# Patient Record
Sex: FEMALE | Race: WHITE | NOT HISPANIC OR LATINO | Employment: OTHER | URBAN - NONMETROPOLITAN AREA
[De-identification: names, ages, dates, MRNs, and addresses within clinical notes are randomized per-mention and may not be internally consistent; named-entity substitution may affect disease eponyms.]

---

## 2021-01-01 ENCOUNTER — HOSPITAL ENCOUNTER (INPATIENT)
Facility: HOSPITAL | Age: 66
LOS: 3 days | DRG: 177 | End: 2021-07-01
Attending: EMERGENCY MEDICINE | Admitting: STUDENT IN AN ORGANIZED HEALTH CARE EDUCATION/TRAINING PROGRAM
Payer: COMMERCIAL

## 2021-01-01 ENCOUNTER — APPOINTMENT (EMERGENCY)
Dept: CT IMAGING | Facility: HOSPITAL | Age: 66
DRG: 177 | End: 2021-01-01
Payer: COMMERCIAL

## 2021-01-01 ENCOUNTER — APPOINTMENT (INPATIENT)
Dept: RADIOLOGY | Facility: HOSPITAL | Age: 66
DRG: 177 | End: 2021-01-01
Payer: COMMERCIAL

## 2021-01-01 ENCOUNTER — APPOINTMENT (INPATIENT)
Dept: ULTRASOUND IMAGING | Facility: HOSPITAL | Age: 66
DRG: 177 | End: 2021-01-01
Payer: COMMERCIAL

## 2021-01-01 ENCOUNTER — APPOINTMENT (EMERGENCY)
Dept: RADIOLOGY | Facility: HOSPITAL | Age: 66
DRG: 177 | End: 2021-01-01
Payer: COMMERCIAL

## 2021-01-01 ENCOUNTER — APPOINTMENT (INPATIENT)
Dept: MRI IMAGING | Facility: HOSPITAL | Age: 66
DRG: 177 | End: 2021-01-01
Payer: COMMERCIAL

## 2021-01-01 VITALS
BODY MASS INDEX: 26.75 KG/M2 | WEIGHT: 151 LBS | SYSTOLIC BLOOD PRESSURE: 89 MMHG | DIASTOLIC BLOOD PRESSURE: 51 MMHG | TEMPERATURE: 95 F | OXYGEN SATURATION: 94 % | HEART RATE: 88 BPM | HEIGHT: 63 IN | RESPIRATION RATE: 30 BRPM

## 2021-01-01 DIAGNOSIS — R79.89 ELEVATED LFTS: ICD-10-CM

## 2021-01-01 DIAGNOSIS — R74.01 ELEVATION OF LEVELS OF LIVER TRANSAMINASE LEVELS: ICD-10-CM

## 2021-01-01 DIAGNOSIS — K29.70 GASTRITIS: ICD-10-CM

## 2021-01-01 DIAGNOSIS — A41.9 SEPSIS (HCC): ICD-10-CM

## 2021-01-01 DIAGNOSIS — R11.2 NAUSEA & VOMITING: Primary | ICD-10-CM

## 2021-01-01 DIAGNOSIS — N17.9 AKI (ACUTE KIDNEY INJURY) (HCC): ICD-10-CM

## 2021-01-01 LAB
ABO GROUP BLD: NORMAL
ALBUMIN SERPL BCP-MCNC: 2.2 G/DL (ref 3.5–5)
ALBUMIN SERPL BCP-MCNC: 2.2 G/DL (ref 3.5–5)
ALBUMIN SERPL BCP-MCNC: 2.4 G/DL (ref 3.5–5)
ALP SERPL-CCNC: 445 U/L (ref 46–116)
ALP SERPL-CCNC: 498 U/L (ref 46–116)
ALP SERPL-CCNC: 565 U/L (ref 46–116)
ALT SERPL W P-5'-P-CCNC: 178 U/L (ref 12–78)
ALT SERPL W P-5'-P-CCNC: 192 U/L (ref 12–78)
ALT SERPL W P-5'-P-CCNC: 293 U/L (ref 12–78)
ANION GAP SERPL CALCULATED.3IONS-SCNC: 16 MMOL/L (ref 4–13)
ANION GAP SERPL CALCULATED.3IONS-SCNC: 19 MMOL/L (ref 4–13)
ANION GAP SERPL CALCULATED.3IONS-SCNC: 20 MMOL/L (ref 4–13)
APTT PPP: 27 SECONDS (ref 23–37)
AST SERPL W P-5'-P-CCNC: 329 U/L (ref 5–45)
AST SERPL W P-5'-P-CCNC: 356 U/L (ref 5–45)
AST SERPL W P-5'-P-CCNC: 982 U/L (ref 5–45)
BACTERIA UR CULT: ABNORMAL
BACTERIA UR QL AUTO: ABNORMAL /HPF
BASE EX.OXY STD BLDV CALC-SCNC: 60.7 % (ref 60–80)
BASE EXCESS BLDV CALC-SCNC: -12.9 MMOL/L
BASOPHILS # BLD AUTO: 0.04 THOUSANDS/ΜL (ref 0–0.1)
BASOPHILS # BLD AUTO: 0.05 THOUSANDS/ΜL (ref 0–0.1)
BASOPHILS # BLD MANUAL: 0 THOUSAND/UL (ref 0–0.1)
BASOPHILS NFR BLD AUTO: 0 % (ref 0–1)
BASOPHILS NFR BLD AUTO: 0 % (ref 0–1)
BASOPHILS NFR MAR MANUAL: 0 % (ref 0–1)
BILIRUB SERPL-MCNC: 2.77 MG/DL (ref 0.2–1)
BILIRUB SERPL-MCNC: 3.94 MG/DL (ref 0.2–1)
BILIRUB SERPL-MCNC: 4.56 MG/DL (ref 0.2–1)
BILIRUB UR QL STRIP: ABNORMAL
BLD GP AB SCN SERPL QL: NEGATIVE
BUN SERPL-MCNC: 32 MG/DL (ref 5–25)
BUN SERPL-MCNC: 33 MG/DL (ref 5–25)
BUN SERPL-MCNC: 37 MG/DL (ref 5–25)
CALCIUM ALBUM COR SERPL-MCNC: 7.9 MG/DL (ref 8.3–10.1)
CALCIUM ALBUM COR SERPL-MCNC: 8.4 MG/DL (ref 8.3–10.1)
CALCIUM ALBUM COR SERPL-MCNC: 9.4 MG/DL (ref 8.3–10.1)
CALCIUM SERPL-MCNC: 6.5 MG/DL (ref 8.3–10.1)
CALCIUM SERPL-MCNC: 7 MG/DL (ref 8.3–10.1)
CALCIUM SERPL-MCNC: 8.1 MG/DL (ref 8.3–10.1)
CHLORIDE SERPL-SCNC: 104 MMOL/L (ref 100–108)
CHLORIDE SERPL-SCNC: 105 MMOL/L (ref 100–108)
CHLORIDE SERPL-SCNC: 98 MMOL/L (ref 100–108)
CLARITY UR: CLEAR
CO2 SERPL-SCNC: 15 MMOL/L (ref 21–32)
CO2 SERPL-SCNC: 16 MMOL/L (ref 21–32)
CO2 SERPL-SCNC: 20 MMOL/L (ref 21–32)
COARSE GRAN CASTS URNS QL MICRO: ABNORMAL /LPF
COLOR UR: ABNORMAL
CREAT SERPL-MCNC: 1.43 MG/DL (ref 0.6–1.3)
CREAT SERPL-MCNC: 1.83 MG/DL (ref 0.6–1.3)
CREAT SERPL-MCNC: 2.37 MG/DL (ref 0.6–1.3)
EOSINOPHIL # BLD AUTO: 0.01 THOUSAND/ΜL (ref 0–0.61)
EOSINOPHIL # BLD AUTO: 0.01 THOUSAND/ΜL (ref 0–0.61)
EOSINOPHIL # BLD MANUAL: 0 THOUSAND/UL (ref 0–0.4)
EOSINOPHIL NFR BLD AUTO: 0 % (ref 0–6)
EOSINOPHIL NFR BLD AUTO: 0 % (ref 0–6)
EOSINOPHIL NFR BLD MANUAL: 0 % (ref 0–6)
ERYTHROCYTE [DISTWIDTH] IN BLOOD BY AUTOMATED COUNT: 18.7 % (ref 11.6–15.1)
ERYTHROCYTE [DISTWIDTH] IN BLOOD BY AUTOMATED COUNT: 19.4 % (ref 11.6–15.1)
ERYTHROCYTE [DISTWIDTH] IN BLOOD BY AUTOMATED COUNT: 20.2 % (ref 11.6–15.1)
GFR SERPL CREATININE-BSD FRML MDRD: 21 ML/MIN/1.73SQ M
GFR SERPL CREATININE-BSD FRML MDRD: 28 ML/MIN/1.73SQ M
GFR SERPL CREATININE-BSD FRML MDRD: 38 ML/MIN/1.73SQ M
GLUCOSE SERPL-MCNC: 139 MG/DL (ref 65–140)
GLUCOSE SERPL-MCNC: 225 MG/DL (ref 65–140)
GLUCOSE SERPL-MCNC: 71 MG/DL (ref 65–140)
GLUCOSE UR STRIP-MCNC: ABNORMAL MG/DL
HCO3 BLDV-SCNC: 12.8 MMOL/L (ref 24–30)
HCT VFR BLD AUTO: 37.7 % (ref 34.8–46.1)
HCT VFR BLD AUTO: 39.8 % (ref 34.8–46.1)
HCT VFR BLD AUTO: 40 % (ref 34.8–46.1)
HGB BLD-MCNC: 12.2 G/DL (ref 11.5–15.4)
HGB BLD-MCNC: 13.2 G/DL (ref 11.5–15.4)
HGB BLD-MCNC: 13.4 G/DL (ref 11.5–15.4)
HGB UR QL STRIP.AUTO: ABNORMAL
HYALINE CASTS #/AREA URNS LPF: ABNORMAL /LPF
IMM GRANULOCYTES # BLD AUTO: 0.19 THOUSAND/UL (ref 0–0.2)
IMM GRANULOCYTES # BLD AUTO: 0.32 THOUSAND/UL (ref 0–0.2)
IMM GRANULOCYTES NFR BLD AUTO: 1 % (ref 0–2)
IMM GRANULOCYTES NFR BLD AUTO: 2 % (ref 0–2)
INR PPP: 1.21 (ref 0.84–1.19)
KETONES UR STRIP-MCNC: ABNORMAL MG/DL
LACTATE SERPL-SCNC: 2 MMOL/L (ref 0.5–2)
LACTATE SERPL-SCNC: 5.1 MMOL/L (ref 0.5–2)
LEUKOCYTE ESTERASE UR QL STRIP: ABNORMAL
LYMPHOCYTES # BLD AUTO: 1.6 THOUSANDS/ΜL (ref 0.6–4.47)
LYMPHOCYTES # BLD AUTO: 1.79 THOUSAND/UL (ref 0.6–4.47)
LYMPHOCYTES # BLD AUTO: 2.39 THOUSANDS/ΜL (ref 0.6–4.47)
LYMPHOCYTES # BLD AUTO: 8 % (ref 14–44)
LYMPHOCYTES NFR BLD AUTO: 14 % (ref 14–44)
LYMPHOCYTES NFR BLD AUTO: 9 % (ref 14–44)
MAGNESIUM SERPL-MCNC: 2.4 MG/DL (ref 1.6–2.6)
MCH RBC QN AUTO: 32.6 PG (ref 26.8–34.3)
MCH RBC QN AUTO: 32.7 PG (ref 26.8–34.3)
MCH RBC QN AUTO: 33.2 PG (ref 26.8–34.3)
MCHC RBC AUTO-ENTMCNC: 32.4 G/DL (ref 31.4–37.4)
MCHC RBC AUTO-ENTMCNC: 33.2 G/DL (ref 31.4–37.4)
MCHC RBC AUTO-ENTMCNC: 33.5 G/DL (ref 31.4–37.4)
MCV RBC AUTO: 100 FL (ref 82–98)
MCV RBC AUTO: 101 FL (ref 82–98)
MCV RBC AUTO: 97 FL (ref 82–98)
MONOCYTES # BLD AUTO: 0.67 THOUSAND/UL (ref 0–1.22)
MONOCYTES # BLD AUTO: 1.29 THOUSAND/ΜL (ref 0.17–1.22)
MONOCYTES # BLD AUTO: 1.63 THOUSAND/ΜL (ref 0.17–1.22)
MONOCYTES NFR BLD AUTO: 7 % (ref 4–12)
MONOCYTES NFR BLD AUTO: 9 % (ref 4–12)
MONOCYTES NFR BLD: 3 % (ref 4–12)
MRSA NOSE QL CULT: NORMAL
NEUTROPHILS # BLD AUTO: 13.49 THOUSANDS/ΜL (ref 1.85–7.62)
NEUTROPHILS # BLD AUTO: 14.44 THOUSANDS/ΜL (ref 1.85–7.62)
NEUTROPHILS # BLD MANUAL: 19.92 THOUSAND/UL (ref 1.85–7.62)
NEUTS SEG NFR BLD AUTO: 76 % (ref 43–75)
NEUTS SEG NFR BLD AUTO: 82 % (ref 43–75)
NEUTS SEG NFR BLD AUTO: 89 % (ref 43–75)
NITRITE UR QL STRIP: NEGATIVE
NON-SQ EPI CELLS URNS QL MICRO: ABNORMAL /HPF
NRBC BLD AUTO-RTO: 0 /100 WBCS
NRBC BLD AUTO-RTO: 1 /100 WBCS
NRBC BLD AUTO-RTO: 1 /100 WBCS
O2 CT BLDV-SCNC: 11.1 ML/DL
OTHER STN SPEC: ABNORMAL
PCO2 BLDV: 29.2 MM HG (ref 42–50)
PH BLDV: 7.26 [PH] (ref 7.3–7.4)
PH UR STRIP.AUTO: 5.5 [PH]
PHOSPHATE SERPL-MCNC: 3.2 MG/DL (ref 2.3–4.1)
PLATELET # BLD AUTO: 239 THOUSANDS/UL (ref 149–390)
PLATELET # BLD AUTO: 251 THOUSANDS/UL (ref 149–390)
PLATELET # BLD AUTO: 282 THOUSANDS/UL (ref 149–390)
PLATELET BLD QL SMEAR: ADEQUATE
PMV BLD AUTO: 10 FL (ref 8.9–12.7)
PMV BLD AUTO: 10.2 FL (ref 8.9–12.7)
PMV BLD AUTO: 10.4 FL (ref 8.9–12.7)
PO2 BLDV: 38.3 MM HG (ref 35–45)
POLYCHROMASIA BLD QL SMEAR: PRESENT
POTASSIUM SERPL-SCNC: 4 MMOL/L (ref 3.5–5.3)
POTASSIUM SERPL-SCNC: 4.7 MMOL/L (ref 3.5–5.3)
POTASSIUM SERPL-SCNC: 4.9 MMOL/L (ref 3.5–5.3)
PROCALCITONIN SERPL-MCNC: 1.46 NG/ML
PROCALCITONIN SERPL-MCNC: 2.03 NG/ML
PROT SERPL-MCNC: 5 G/DL (ref 6.4–8.2)
PROT SERPL-MCNC: 5.4 G/DL (ref 6.4–8.2)
PROT SERPL-MCNC: 5.6 G/DL (ref 6.4–8.2)
PROT UR STRIP-MCNC: ABNORMAL MG/DL
PROTHROMBIN TIME: 15.1 SECONDS (ref 11.6–14.5)
RBC # BLD AUTO: 3.73 MILLION/UL (ref 3.81–5.12)
RBC # BLD AUTO: 3.98 MILLION/UL (ref 3.81–5.12)
RBC # BLD AUTO: 4.11 MILLION/UL (ref 3.81–5.12)
RBC #/AREA URNS AUTO: ABNORMAL /HPF
RH BLD: POSITIVE
SODIUM SERPL-SCNC: 134 MMOL/L (ref 136–145)
SODIUM SERPL-SCNC: 139 MMOL/L (ref 136–145)
SODIUM SERPL-SCNC: 140 MMOL/L (ref 136–145)
SP GR UR STRIP.AUTO: 1.02 (ref 1–1.03)
SPECIMEN EXPIRATION DATE: NORMAL
TOTAL CELLS COUNTED SPEC: 100
TSH SERPL DL<=0.05 MIU/L-ACNC: 0.62 UIU/ML (ref 0.36–3.74)
UROBILINOGEN UR QL STRIP.AUTO: >=8 E.U./DL
WBC # BLD AUTO: 17.7 THOUSAND/UL (ref 4.31–10.16)
WBC # BLD AUTO: 17.76 THOUSAND/UL (ref 4.31–10.16)
WBC # BLD AUTO: 22.38 THOUSAND/UL (ref 4.31–10.16)
WBC #/AREA URNS AUTO: ABNORMAL /HPF

## 2021-01-01 PROCEDURE — 99232 SBSQ HOSP IP/OBS MODERATE 35: CPT | Performed by: PHYSICIAN ASSISTANT

## 2021-01-01 PROCEDURE — 96375 TX/PRO/DX INJ NEW DRUG ADDON: CPT

## 2021-01-01 PROCEDURE — 74176 CT ABD & PELVIS W/O CONTRAST: CPT

## 2021-01-01 PROCEDURE — 76705 ECHO EXAM OF ABDOMEN: CPT

## 2021-01-01 PROCEDURE — 86901 BLOOD TYPING SEROLOGIC RH(D): CPT | Performed by: EMERGENCY MEDICINE

## 2021-01-01 PROCEDURE — 86850 RBC ANTIBODY SCREEN: CPT | Performed by: EMERGENCY MEDICINE

## 2021-01-01 PROCEDURE — 99497 ADVNCD CARE PLAN 30 MIN: CPT | Performed by: PHYSICIAN ASSISTANT

## 2021-01-01 PROCEDURE — 87081 CULTURE SCREEN ONLY: CPT | Performed by: NURSE PRACTITIONER

## 2021-01-01 PROCEDURE — 82805 BLOOD GASES W/O2 SATURATION: CPT | Performed by: PHYSICIAN ASSISTANT

## 2021-01-01 PROCEDURE — C9113 INJ PANTOPRAZOLE SODIUM, VIA: HCPCS | Performed by: NURSE PRACTITIONER

## 2021-01-01 PROCEDURE — 87086 URINE CULTURE/COLONY COUNT: CPT | Performed by: EMERGENCY MEDICINE

## 2021-01-01 PROCEDURE — 96367 TX/PROPH/DG ADDL SEQ IV INF: CPT

## 2021-01-01 PROCEDURE — 71045 X-RAY EXAM CHEST 1 VIEW: CPT

## 2021-01-01 PROCEDURE — 87186 SC STD MICRODIL/AGAR DIL: CPT | Performed by: EMERGENCY MEDICINE

## 2021-01-01 PROCEDURE — 80053 COMPREHEN METABOLIC PANEL: CPT | Performed by: EMERGENCY MEDICINE

## 2021-01-01 PROCEDURE — 85610 PROTHROMBIN TIME: CPT | Performed by: EMERGENCY MEDICINE

## 2021-01-01 PROCEDURE — 81001 URINALYSIS AUTO W/SCOPE: CPT | Performed by: EMERGENCY MEDICINE

## 2021-01-01 PROCEDURE — 84100 ASSAY OF PHOSPHORUS: CPT | Performed by: NURSE PRACTITIONER

## 2021-01-01 PROCEDURE — 84145 PROCALCITONIN (PCT): CPT | Performed by: NURSE PRACTITIONER

## 2021-01-01 PROCEDURE — C9113 INJ PANTOPRAZOLE SODIUM, VIA: HCPCS | Performed by: EMERGENCY MEDICINE

## 2021-01-01 PROCEDURE — 86900 BLOOD TYPING SEROLOGIC ABO: CPT | Performed by: EMERGENCY MEDICINE

## 2021-01-01 PROCEDURE — 70450 CT HEAD/BRAIN W/O DYE: CPT

## 2021-01-01 PROCEDURE — 85007 BL SMEAR W/DIFF WBC COUNT: CPT | Performed by: PHYSICIAN ASSISTANT

## 2021-01-01 PROCEDURE — 99291 CRITICAL CARE FIRST HOUR: CPT | Performed by: EMERGENCY MEDICINE

## 2021-01-01 PROCEDURE — 99239 HOSP IP/OBS DSCHRG MGMT >30: CPT | Performed by: NURSE PRACTITIONER

## 2021-01-01 PROCEDURE — 83605 ASSAY OF LACTIC ACID: CPT | Performed by: EMERGENCY MEDICINE

## 2021-01-01 PROCEDURE — 80053 COMPREHEN METABOLIC PANEL: CPT | Performed by: NURSE PRACTITIONER

## 2021-01-01 PROCEDURE — 99223 1ST HOSP IP/OBS HIGH 75: CPT | Performed by: STUDENT IN AN ORGANIZED HEALTH CARE EDUCATION/TRAINING PROGRAM

## 2021-01-01 PROCEDURE — G1004 CDSM NDSC: HCPCS

## 2021-01-01 PROCEDURE — 87077 CULTURE AEROBIC IDENTIFY: CPT | Performed by: EMERGENCY MEDICINE

## 2021-01-01 PROCEDURE — 85730 THROMBOPLASTIN TIME PARTIAL: CPT | Performed by: EMERGENCY MEDICINE

## 2021-01-01 PROCEDURE — 84443 ASSAY THYROID STIM HORMONE: CPT | Performed by: NURSE PRACTITIONER

## 2021-01-01 PROCEDURE — 85027 COMPLETE CBC AUTOMATED: CPT | Performed by: PHYSICIAN ASSISTANT

## 2021-01-01 PROCEDURE — 83735 ASSAY OF MAGNESIUM: CPT | Performed by: NURSE PRACTITIONER

## 2021-01-01 PROCEDURE — 99285 EMERGENCY DEPT VISIT HI MDM: CPT

## 2021-01-01 PROCEDURE — 96365 THER/PROPH/DIAG IV INF INIT: CPT

## 2021-01-01 PROCEDURE — 87040 BLOOD CULTURE FOR BACTERIA: CPT | Performed by: EMERGENCY MEDICINE

## 2021-01-01 PROCEDURE — 74181 MRI ABDOMEN W/O CONTRAST: CPT

## 2021-01-01 PROCEDURE — 80053 COMPREHEN METABOLIC PANEL: CPT | Performed by: PHYSICIAN ASSISTANT

## 2021-01-01 PROCEDURE — 85025 COMPLETE CBC W/AUTO DIFF WBC: CPT | Performed by: EMERGENCY MEDICINE

## 2021-01-01 PROCEDURE — 85025 COMPLETE CBC W/AUTO DIFF WBC: CPT | Performed by: NURSE PRACTITIONER

## 2021-01-01 PROCEDURE — 36415 COLL VENOUS BLD VENIPUNCTURE: CPT | Performed by: EMERGENCY MEDICINE

## 2021-01-01 PROCEDURE — 96361 HYDRATE IV INFUSION ADD-ON: CPT

## 2021-01-01 RX ORDER — DIPHENHYDRAMINE HCL 25 MG
25 TABLET ORAL EVERY 6 HOURS PRN
Status: DISCONTINUED | OUTPATIENT
Start: 2021-01-01 | End: 2021-01-01 | Stop reason: HOSPADM

## 2021-01-01 RX ORDER — HYDROCORTISONE 10 MG/1
10 TABLET ORAL DAILY
Status: DISCONTINUED | OUTPATIENT
Start: 2021-01-01 | End: 2021-01-01

## 2021-01-01 RX ORDER — LEVOTHYROXINE SODIUM 0.05 MG/1
50 TABLET ORAL DAILY
Status: DISCONTINUED | OUTPATIENT
Start: 2021-01-01 | End: 2021-01-01

## 2021-01-01 RX ORDER — HEPARIN SODIUM 5000 [USP'U]/ML
5000 INJECTION, SOLUTION INTRAVENOUS; SUBCUTANEOUS EVERY 8 HOURS SCHEDULED
Status: DISCONTINUED | OUTPATIENT
Start: 2021-01-01 | End: 2021-01-01

## 2021-01-01 RX ORDER — HYDROMORPHONE HCL/PF 1 MG/ML
0.5 SYRINGE (ML) INJECTION EVERY 4 HOURS PRN
Status: DISCONTINUED | OUTPATIENT
Start: 2021-01-01 | End: 2021-01-01

## 2021-01-01 RX ORDER — ACETAMINOPHEN 325 MG/1
650 TABLET ORAL EVERY 6 HOURS PRN
Status: DISCONTINUED | OUTPATIENT
Start: 2021-01-01 | End: 2021-01-01 | Stop reason: HOSPADM

## 2021-01-01 RX ORDER — FLUOXETINE HYDROCHLORIDE 20 MG/1
20 CAPSULE ORAL DAILY
Status: DISCONTINUED | OUTPATIENT
Start: 2021-01-01 | End: 2021-01-01

## 2021-01-01 RX ORDER — CHOLECALCIFEROL (VITAMIN D3) 125 MCG
CAPSULE ORAL
COMMUNITY

## 2021-01-01 RX ORDER — HYDROCORTISONE 5 MG/1
5 TABLET ORAL DAILY
COMMUNITY
End: 2021-01-01 | Stop reason: HOSPADM

## 2021-01-01 RX ORDER — BISACODYL 10 MG
10 SUPPOSITORY, RECTAL RECTAL DAILY PRN
Status: DISCONTINUED | OUTPATIENT
Start: 2021-01-01 | End: 2021-01-01 | Stop reason: HOSPADM

## 2021-01-01 RX ORDER — FUROSEMIDE 10 MG/ML
20 INJECTION INTRAMUSCULAR; INTRAVENOUS ONCE
Status: COMPLETED | OUTPATIENT
Start: 2021-01-01 | End: 2021-01-01

## 2021-01-01 RX ORDER — HYDROCORTISONE 10 MG/1
10 TABLET ORAL DAILY
COMMUNITY
End: 2021-01-01 | Stop reason: HOSPADM

## 2021-01-01 RX ORDER — SIMETHICONE 20 MG/.3ML
40 EMULSION ORAL 3 TIMES DAILY
Status: DISCONTINUED | OUTPATIENT
Start: 2021-01-01 | End: 2021-01-01

## 2021-01-01 RX ORDER — ASPIRIN 81 MG/1
81 TABLET ORAL DAILY
COMMUNITY
End: 2021-01-01 | Stop reason: HOSPADM

## 2021-01-01 RX ORDER — SIMETHICONE 20 MG/.3ML
40 EMULSION ORAL EVERY 6 HOURS PRN
Status: DISCONTINUED | OUTPATIENT
Start: 2021-01-01 | End: 2021-01-01

## 2021-01-01 RX ORDER — LORAZEPAM 2 MG/ML
1 INJECTION INTRAMUSCULAR
Status: DISCONTINUED | OUTPATIENT
Start: 2021-01-01 | End: 2021-01-01 | Stop reason: HOSPADM

## 2021-01-01 RX ORDER — HYDROMORPHONE HCL/PF 1 MG/ML
0.3 SYRINGE (ML) INJECTION EVERY 2 HOUR PRN
Status: DISCONTINUED | OUTPATIENT
Start: 2021-01-01 | End: 2021-01-01 | Stop reason: HOSPADM

## 2021-01-01 RX ORDER — VANCOMYCIN HYDROCHLORIDE 1 G/200ML
15 INJECTION, SOLUTION INTRAVENOUS EVERY 12 HOURS
Status: DISCONTINUED | OUTPATIENT
Start: 2021-01-01 | End: 2021-01-01 | Stop reason: DRUGHIGH

## 2021-01-01 RX ORDER — ONDANSETRON 2 MG/ML
4 INJECTION INTRAMUSCULAR; INTRAVENOUS EVERY 8 HOURS PRN
Status: DISCONTINUED | OUTPATIENT
Start: 2021-01-01 | End: 2021-01-01 | Stop reason: HOSPADM

## 2021-01-01 RX ORDER — LEVOTHYROXINE SODIUM 0.05 MG/1
50 TABLET ORAL DAILY
COMMUNITY
End: 2021-01-01 | Stop reason: HOSPADM

## 2021-01-01 RX ORDER — METOCLOPRAMIDE HYDROCHLORIDE 5 MG/ML
10 INJECTION INTRAMUSCULAR; INTRAVENOUS ONCE
Status: COMPLETED | OUTPATIENT
Start: 2021-01-01 | End: 2021-01-01

## 2021-01-01 RX ORDER — HYDROCORTISONE 5 MG/1
5 TABLET ORAL DAILY
Status: DISCONTINUED | OUTPATIENT
Start: 2021-01-01 | End: 2021-01-01

## 2021-01-01 RX ORDER — PANTOPRAZOLE SODIUM 40 MG/1
40 INJECTION, POWDER, FOR SOLUTION INTRAVENOUS
Status: DISCONTINUED | OUTPATIENT
Start: 2021-01-01 | End: 2021-01-01

## 2021-01-01 RX ORDER — GLYCOPYRROLATE 0.2 MG/ML
0.1 INJECTION INTRAMUSCULAR; INTRAVENOUS EVERY 4 HOURS PRN
Status: DISCONTINUED | OUTPATIENT
Start: 2021-01-01 | End: 2021-01-01 | Stop reason: HOSPADM

## 2021-01-01 RX ORDER — SODIUM CHLORIDE 9 MG/ML
100 INJECTION, SOLUTION INTRAVENOUS CONTINUOUS
Status: DISCONTINUED | OUTPATIENT
Start: 2021-01-01 | End: 2021-01-01

## 2021-01-01 RX ORDER — DEXAMETHASONE SODIUM PHOSPHATE 10 MG/ML
10 INJECTION, SOLUTION INTRAMUSCULAR; INTRAVENOUS ONCE
Status: COMPLETED | OUTPATIENT
Start: 2021-01-01 | End: 2021-01-01

## 2021-01-01 RX ORDER — OXYCODONE HYDROCHLORIDE 5 MG/1
2.5 TABLET ORAL EVERY 6 HOURS PRN
Status: DISCONTINUED | OUTPATIENT
Start: 2021-01-01 | End: 2021-01-01 | Stop reason: HOSPADM

## 2021-01-01 RX ORDER — ASPIRIN 81 MG/1
81 TABLET ORAL DAILY
Status: DISCONTINUED | OUTPATIENT
Start: 2021-01-01 | End: 2021-01-01

## 2021-01-01 RX ORDER — LANOLIN ALCOHOL/MO/W.PET/CERES
6 CREAM (GRAM) TOPICAL
Status: DISCONTINUED | OUTPATIENT
Start: 2021-01-01 | End: 2021-01-01

## 2021-01-01 RX ORDER — OXYCODONE HYDROCHLORIDE 5 MG/1
5 TABLET ORAL EVERY 6 HOURS PRN
Status: DISCONTINUED | OUTPATIENT
Start: 2021-01-01 | End: 2021-01-01 | Stop reason: HOSPADM

## 2021-01-01 RX ORDER — VANCOMYCIN HYDROCHLORIDE 1 G/200ML
15 INJECTION, SOLUTION INTRAVENOUS ONCE
Status: COMPLETED | OUTPATIENT
Start: 2021-01-01 | End: 2021-01-01

## 2021-01-01 RX ORDER — DIPHENHYDRAMINE HYDROCHLORIDE 50 MG/ML
50 INJECTION INTRAMUSCULAR; INTRAVENOUS ONCE
Status: COMPLETED | OUTPATIENT
Start: 2021-01-01 | End: 2021-01-01

## 2021-01-01 RX ORDER — MORPHINE SULFATE 4 MG/ML
4 INJECTION, SOLUTION INTRAMUSCULAR; INTRAVENOUS ONCE
Status: COMPLETED | OUTPATIENT
Start: 2021-01-01 | End: 2021-01-01

## 2021-01-01 RX ORDER — DIPHENHYDRAMINE HYDROCHLORIDE 50 MG/ML
25 INJECTION INTRAMUSCULAR; INTRAVENOUS ONCE
Status: COMPLETED | OUTPATIENT
Start: 2021-01-01 | End: 2021-01-01

## 2021-01-01 RX ORDER — VANCOMYCIN HYDROCHLORIDE 1 G/200ML
15 INJECTION, SOLUTION INTRAVENOUS EVERY 24 HOURS
Status: DISCONTINUED | OUTPATIENT
Start: 2021-01-01 | End: 2021-01-01 | Stop reason: DRUGHIGH

## 2021-01-01 RX ORDER — HYDROMORPHONE HCL/PF 1 MG/ML
0.5 SYRINGE (ML) INJECTION ONCE
Status: DISCONTINUED | OUTPATIENT
Start: 2021-01-01 | End: 2021-01-01

## 2021-01-01 RX ORDER — CEFEPIME HYDROCHLORIDE 1 G/50ML
1000 INJECTION, SOLUTION INTRAVENOUS EVERY 12 HOURS
Status: DISCONTINUED | OUTPATIENT
Start: 2021-01-01 | End: 2021-01-01

## 2021-01-01 RX ORDER — FLUOXETINE HYDROCHLORIDE 20 MG/1
20 CAPSULE ORAL DAILY
COMMUNITY
End: 2021-01-01 | Stop reason: HOSPADM

## 2021-01-01 RX ORDER — DIAZEPAM 5 MG/ML
5 INJECTION, SOLUTION INTRAMUSCULAR; INTRAVENOUS ONCE AS NEEDED
Status: COMPLETED | OUTPATIENT
Start: 2021-01-01 | End: 2021-01-01

## 2021-01-01 RX ADMIN — PIPERACILLIN AND TAZOBACTAM 2.25 G: 2; .25 INJECTION, POWDER, LYOPHILIZED, FOR SOLUTION INTRAVENOUS at 09:18

## 2021-01-01 RX ADMIN — PIPERACILLIN AND TAZOBACTAM 2.25 G: 2; .25 INJECTION, POWDER, LYOPHILIZED, FOR SOLUTION INTRAVENOUS at 14:03

## 2021-01-01 RX ADMIN — OXYCODONE HYDROCHLORIDE 5 MG: 5 TABLET ORAL at 14:40

## 2021-01-01 RX ADMIN — PIPERACILLIN AND TAZOBACTAM 2.25 G: 2; .25 INJECTION, POWDER, LYOPHILIZED, FOR SOLUTION INTRAVENOUS at 01:22

## 2021-01-01 RX ADMIN — VANCOMYCIN HYDROCHLORIDE 1000 MG: 1 INJECTION, SOLUTION INTRAVENOUS at 05:18

## 2021-01-01 RX ADMIN — SODIUM CHLORIDE 3.38 G: 9 INJECTION, SOLUTION INTRAVENOUS at 05:23

## 2021-01-01 RX ADMIN — FLUOXETINE 20 MG: 20 CAPSULE ORAL at 09:31

## 2021-01-01 RX ADMIN — HEPARIN SODIUM 5000 UNITS: 5000 INJECTION INTRAVENOUS; SUBCUTANEOUS at 01:23

## 2021-01-01 RX ADMIN — SIMETHICONE 40 MG: 20 SUSPENSION/ DROPS ORAL at 14:08

## 2021-01-01 RX ADMIN — SODIUM CHLORIDE 125 ML/HR: 0.9 INJECTION, SOLUTION INTRAVENOUS at 09:37

## 2021-01-01 RX ADMIN — PIPERACILLIN AND TAZOBACTAM 2.25 G: 2; .25 INJECTION, POWDER, LYOPHILIZED, FOR SOLUTION INTRAVENOUS at 14:02

## 2021-01-01 RX ADMIN — LEVOTHYROXINE SODIUM 50 MCG: 50 TABLET ORAL at 08:01

## 2021-01-01 RX ADMIN — SIMETHICONE 40 MG: 20 SUSPENSION/ DROPS ORAL at 16:32

## 2021-01-01 RX ADMIN — ONDANSETRON 4 MG: 2 INJECTION INTRAMUSCULAR; INTRAVENOUS at 01:28

## 2021-01-01 RX ADMIN — SODIUM CHLORIDE 125 ML/HR: 0.9 INJECTION, SOLUTION INTRAVENOUS at 04:45

## 2021-01-01 RX ADMIN — MELATONIN 6 MG: 3 TAB ORAL at 20:41

## 2021-01-01 RX ADMIN — HEPARIN SODIUM 5000 UNITS: 5000 INJECTION INTRAVENOUS; SUBCUTANEOUS at 18:07

## 2021-01-01 RX ADMIN — METOCLOPRAMIDE HYDROCHLORIDE 10 MG: 5 INJECTION INTRAMUSCULAR; INTRAVENOUS at 02:34

## 2021-01-01 RX ADMIN — PIPERACILLIN AND TAZOBACTAM 2.25 G: 2; .25 INJECTION, POWDER, LYOPHILIZED, FOR SOLUTION INTRAVENOUS at 20:41

## 2021-01-01 RX ADMIN — PANTOPRAZOLE SODIUM 40 MG: 40 INJECTION, POWDER, FOR SOLUTION INTRAVENOUS at 08:00

## 2021-01-01 RX ADMIN — SODIUM CHLORIDE 2000 ML: 0.9 INJECTION, SOLUTION INTRAVENOUS at 02:22

## 2021-01-01 RX ADMIN — VANCOMYCIN HYDROCHLORIDE 1000 MG: 1 INJECTION, SOLUTION INTRAVENOUS at 04:06

## 2021-01-01 RX ADMIN — CEFEPIME HYDROCHLORIDE 1000 MG: 1 INJECTION, SOLUTION INTRAVENOUS at 14:05

## 2021-01-01 RX ADMIN — HYDROCORTISONE 5 MG: 10 TABLET ORAL at 16:37

## 2021-01-01 RX ADMIN — HEPARIN SODIUM 5000 UNITS: 5000 INJECTION INTRAVENOUS; SUBCUTANEOUS at 18:02

## 2021-01-01 RX ADMIN — HYDROCORTISONE 5 MG: 10 TABLET ORAL at 18:07

## 2021-01-01 RX ADMIN — HYDROMORPHONE HYDROCHLORIDE 0.5 MG: 1 INJECTION, SOLUTION INTRAMUSCULAR; INTRAVENOUS; SUBCUTANEOUS at 16:32

## 2021-01-01 RX ADMIN — ASPIRIN 81 MG: 81 TABLET, COATED ORAL at 08:01

## 2021-01-01 RX ADMIN — LEVOTHYROXINE SODIUM 50 MCG: 50 TABLET ORAL at 09:34

## 2021-01-01 RX ADMIN — DIAZEPAM 5 MG: 10 INJECTION, SOLUTION INTRAMUSCULAR; INTRAVENOUS at 15:18

## 2021-01-01 RX ADMIN — ONDANSETRON 4 MG: 2 INJECTION INTRAMUSCULAR; INTRAVENOUS at 16:23

## 2021-01-01 RX ADMIN — HEPARIN SODIUM 5000 UNITS: 5000 INJECTION INTRAVENOUS; SUBCUTANEOUS at 09:31

## 2021-01-01 RX ADMIN — HYDROCORTISONE 10 MG: 10 TABLET ORAL at 09:35

## 2021-01-01 RX ADMIN — ONDANSETRON 4 MG: 2 INJECTION INTRAMUSCULAR; INTRAVENOUS at 00:17

## 2021-01-01 RX ADMIN — VANCOMYCIN HYDROCHLORIDE 750 MG: 750 INJECTION, SOLUTION INTRAVENOUS at 14:44

## 2021-01-01 RX ADMIN — HYDROCORTISONE 10 MG: 10 TABLET ORAL at 09:40

## 2021-01-01 RX ADMIN — FUROSEMIDE 20 MG: 10 INJECTION, SOLUTION INTRAMUSCULAR; INTRAVENOUS at 14:47

## 2021-01-01 RX ADMIN — HYDROMORPHONE HYDROCHLORIDE 0.3 MG: 1 INJECTION, SOLUTION INTRAMUSCULAR; INTRAVENOUS; SUBCUTANEOUS at 22:37

## 2021-01-01 RX ADMIN — PIPERACILLIN AND TAZOBACTAM 2.25 G: 2; .25 INJECTION, POWDER, LYOPHILIZED, FOR SOLUTION INTRAVENOUS at 20:46

## 2021-01-01 RX ADMIN — PIPERACILLIN AND TAZOBACTAM 2.25 G: 2; .25 INJECTION, POWDER, LYOPHILIZED, FOR SOLUTION INTRAVENOUS at 03:42

## 2021-01-01 RX ADMIN — HEPARIN SODIUM 5000 UNITS: 5000 INJECTION INTRAVENOUS; SUBCUTANEOUS at 09:34

## 2021-01-01 RX ADMIN — DEXAMETHASONE SODIUM PHOSPHATE 10 MG: 10 INJECTION, SOLUTION INTRAMUSCULAR; INTRAVENOUS at 02:34

## 2021-01-01 RX ADMIN — SIMETHICONE 40 MG: 20 SUSPENSION/ DROPS ORAL at 20:42

## 2021-01-01 RX ADMIN — ASPIRIN 81 MG: 81 TABLET, COATED ORAL at 09:35

## 2021-01-01 RX ADMIN — SODIUM CHLORIDE 125 ML/HR: 0.9 INJECTION, SOLUTION INTRAVENOUS at 01:24

## 2021-01-01 RX ADMIN — DIPHENHYDRAMINE HYDROCHLORIDE 25 MG: 50 INJECTION, SOLUTION INTRAMUSCULAR; INTRAVENOUS at 00:16

## 2021-01-01 RX ADMIN — HYDROCORTISONE 10 MG: 10 TABLET ORAL at 08:01

## 2021-01-01 RX ADMIN — LEVOTHYROXINE SODIUM 50 MCG: 50 TABLET ORAL at 09:31

## 2021-01-01 RX ADMIN — HEPARIN SODIUM 5000 UNITS: 5000 INJECTION INTRAVENOUS; SUBCUTANEOUS at 16:32

## 2021-01-01 RX ADMIN — HYDROCORTISONE 5 MG: 10 TABLET ORAL at 16:17

## 2021-01-01 RX ADMIN — LORAZEPAM 1 MG: 2 INJECTION INTRAMUSCULAR; INTRAVENOUS at 19:46

## 2021-01-01 RX ADMIN — MORPHINE SULFATE 4 MG: 4 INJECTION INTRAVENOUS at 00:16

## 2021-01-01 RX ADMIN — HEPARIN SODIUM 5000 UNITS: 5000 INJECTION INTRAVENOUS; SUBCUTANEOUS at 01:22

## 2021-01-01 RX ADMIN — FLUOXETINE 20 MG: 20 CAPSULE ORAL at 08:01

## 2021-01-01 RX ADMIN — FLUOXETINE 20 MG: 20 CAPSULE ORAL at 09:35

## 2021-01-01 RX ADMIN — ACETAMINOPHEN 650 MG: 325 TABLET ORAL at 20:41

## 2021-01-01 RX ADMIN — METRONIDAZOLE 500 MG: 500 INJECTION, SOLUTION INTRAVENOUS at 14:05

## 2021-01-01 RX ADMIN — DIPHENHYDRAMINE HYDROCHLORIDE 50 MG: 50 INJECTION, SOLUTION INTRAMUSCULAR; INTRAVENOUS at 01:24

## 2021-01-01 RX ADMIN — ONDANSETRON 8 MG: 2 INJECTION INTRAMUSCULAR; INTRAVENOUS at 01:35

## 2021-01-01 RX ADMIN — PIPERACILLIN AND TAZOBACTAM 2.25 G: 2; .25 INJECTION, POWDER, LYOPHILIZED, FOR SOLUTION INTRAVENOUS at 09:27

## 2021-01-01 RX ADMIN — SODIUM CHLORIDE 80 MG: 9 INJECTION, SOLUTION INTRAVENOUS at 02:00

## 2021-01-01 RX ADMIN — ASPIRIN 81 MG: 81 TABLET, COATED ORAL at 09:30

## 2021-01-01 RX ADMIN — HEPARIN SODIUM 5000 UNITS: 5000 INJECTION INTRAVENOUS; SUBCUTANEOUS at 09:19

## 2021-01-01 RX ADMIN — SODIUM CHLORIDE 125 ML/HR: 0.9 INJECTION, SOLUTION INTRAVENOUS at 19:29

## 2021-01-01 RX ADMIN — SODIUM CHLORIDE 125 ML/HR: 0.9 INJECTION, SOLUTION INTRAVENOUS at 05:21

## 2021-01-01 RX ADMIN — PANTOPRAZOLE SODIUM 40 MG: 40 INJECTION, POWDER, FOR SOLUTION INTRAVENOUS at 09:31

## 2021-06-28 PROBLEM — N17.9 AKI (ACUTE KIDNEY INJURY) (HCC): Status: ACTIVE | Noted: 2021-01-01

## 2021-06-28 PROBLEM — R65.20 SEPSIS WITH ACUTE ORGAN DYSFUNCTION (HCC): Status: ACTIVE | Noted: 2021-01-01

## 2021-06-28 PROBLEM — C50.919 METASTATIC BREAST CANCER (HCC): Status: ACTIVE | Noted: 2021-01-01

## 2021-06-28 PROBLEM — E03.9 HYPOTHYROIDISM: Status: ACTIVE | Noted: 2020-01-01

## 2021-06-28 PROBLEM — E23.7 PITUITARY DYSFUNCTION (HCC): Status: ACTIVE | Noted: 2021-01-01

## 2021-06-28 PROBLEM — R74.01 ELEVATION OF LEVELS OF LIVER TRANSAMINASE LEVELS: Status: ACTIVE | Noted: 2021-01-01

## 2021-06-28 PROBLEM — R44.1 VISUAL HALLUCINATIONS: Status: ACTIVE | Noted: 2021-01-01

## 2021-06-28 PROBLEM — A41.9 SEPSIS WITH ACUTE ORGAN DYSFUNCTION (HCC): Status: ACTIVE | Noted: 2021-01-01

## 2021-06-28 NOTE — H&P
[address]  H&P- Lluvia Loud 1955, 77 y o  female MRN: [id number]  Unit/Bed#: -01 Encounter: [id number]  Primary Care Provider: Patti Draper MD   Date and time admitted to hospital: 6/28/2021  1:15 AM    * Sepsis with acute organ dysfunction [facility])  Assessment & Plan  · POA from skilled nursing facility with abdominal pain/right flank pain and vomiting  · Lactic acid 5 1, WBC 17, JANNY, tachycardia  · Fluid resuscitation for ideal body weight with BMI 26 75  · Empiric antibiotic therapy with Zosyn and vancomycin  · Pyuria on catheterized specimen-urine culture pending  · Blood cultures pending  · CT abdomen and pelvis with questionable gallstones, ileus/enteritis  · Ultrasound abdomen pending  · History metastatic breast cancer continuing chemotherapy at [facility] last dose 3 weeks ago  · Currently physical rehab at [facility] with full intention of continuing oncological treatment  · Trend fever curve, leukocytosis, procalcitonin    Elevation of levels of liver transaminase levels  Assessment & Plan  · Elevated liver enzymes, review of records there was no elevation at last hospitalization  · Possibly secondary to sepsis versus gallstone pathology  · Abdominal ultrasound pending  · Abdominal exam benign  · Trend transaminase levels with resolution of sepsis    JANNY (acute kidney injury) ([facility] [address] )  Assessment & Plan  · Creatinine 1 83 on admission with most recent creatinine 0 53 on 06/06/2021  · Continue fluid resuscitation most likely pre renal with vomiting and abdominal pain  · Renally dose medications  · Urinalysis with pyuria  · Empiric antibiotic therapy pending urine culture results  · Trend creatinine    Pituitary dysfunction (HCC)  Assessment & Plan  · Continue hydrocortisone b i d   · Received 10 mg IV Decadron in the emergency department during sepsis resuscitation  · Outpatient endocrinology follow-up    Hypothyroidism  Assessment & Plan  · Continue levothyroxine  · Check TSH    Visual hallucinations  Assessment & Plan  Recently discharged from [facility] June 10th to [facility] after admission for visual hallucinations secondary to leptomeningeal disease of the optic chiasm, history radiation therapy without regaining vision  Permanent blindness    History-leptomeningeal disease of the optic chiasm on brain MRI in [facility], she received 5 fractions of radiation to the base of skull and sella, with last fraction 11/24/2020  Metastatic breast cancer [facility])  Assessment & Plan  · History left ductal carcinoma diagnosed 2007 with subsequent surgical resection, radiation, chemotherapy most recent dose 3 weeks ago  · Hospitalized at [facility], discharged June 10th to [facility] for rehabilitation after falls and weakness with full intention of resuming chemotherapy and full oncological treatment  · Hepatic, lung and bony metastases  · Please see overview for oncology history treatment summary for further details  · Continue outpatient follow-up    VTE Pharmacologic Prophylaxis: VTE Score: 5 High Risk (Score >/= 5) - Pharmacological DVT Prophylaxis Ordered: heparin  Sequential Compression Devices Ordered  Code Status: Level 1 - Full Code POLST and patient     Anticipated Length of Stay: Patient will be admitted on an inpatient basis with an anticipated length of stay of greater than 2 midnights secondary to Sepsis  Total Time for Visit, including Counseling / Coordination of Care: 30 minutes Greater than 50% of this total time spent on direct patient counseling and coordination of care      Chief Complaint:  Abdominal pain vomiting    History of Present Illness:  Shonna Reinoso is a 77 y o  female with a PMH of metastatic breast cancer, vocal cord paralysis, anxiety leptomeningeal disease of the optic chiasm with permanent blindness and visual hallucinations, intermittent confusion and frequent falls continues to undergo chemotherapy at [facility] with last dosage 3 weeks ago  Patient was admitted to [facility] and discharged on June 10th to [facility] for physical rehab with full intention of continuing full oncological therapy upon regaining strength  Nursing facility reports acute onset of nausea and vomiting unrelieved by Zofran, questionable coffee-ground emesis with right flank pain  Upon arrival to the emergency department she was found to have sepsis with lactic acidosis, leukocytosis, tachycardia, JANNY, pyuria on catheterized urine specimen  Fluid resuscitation and empiric antibiotic therapy initiated  CT scan of the abdomen pelvis questionable cholelithiasis and ileus/enteritis on [facility] report  Patient's family initially requested transfer to [facility] but after this was unable to occur patient was admitted to [address] [place] [address]  Review of Systems:  Review of Systems   Constitutional: Negative for chills and fever  HENT: Negative for ear pain and sore throat  Eyes: Negative for pain and visual disturbance  Respiratory: Negative for cough and shortness of breath  Cardiovascular: Negative for chest pain and palpitations  Gastrointestinal: Positive for abdominal pain, nausea and vomiting  Genitourinary: Negative for dysuria and hematuria  Musculoskeletal: Negative for arthralgias and back pain  Skin: Negative for color change and rash  Neurological: Positive for weakness  Negative for seizures and syncope  All other systems reviewed and are negative        Past Medical and Surgical History:   Past Medical History:   Diagnosis Date    Acquired absence of bilateral breasts and nipples     Anxiety disorder     Breast implant status     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     Cortical blindness of right side of brain     Disorder of pituitary gland (HCC)     Dysarthria and anarthria     Insomnia, unspecified     Interstitial lung disease ([facility] [address] )     Malignant neoplasm of left breast ([facility] [address] )     Polyneuropathy     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 Authorizing Provider   aspirin (ECOTRIN LOW STRENGTH) 81 mg EC tablet Take 81 mg by mouth daily   Yes Historical Provider, MD   FLUoxetine (PROzac) 20 mg capsule Take 20 mg by mouth daily   Yes Historical Provider, MD   hydrocortisone (CORTEF) 10 mg tablet Take 10 mg by mouth daily   Yes Historical Provider, MD   hydrocortisone (CORTEF) 5 mg tablet Take 5 mg by mouth daily   Yes Historical Provider, MD   levothyroxine 50 mcg tablet Take 50 mcg by mouth daily   Yes Historical Provider, MD   Melatonin 5 MG TABS Take by mouth   Yes Historical Provider, MD     I have reveiwed home medications using records provided by [facility]  Allergies: Allergies   Allergen Reactions    Piper Anaphylaxis     Any Hot Pepper (Jalapeno, Habanero, etc )   Any Hot Pepper (Jalapeno, Habanero, etc )     Sulfa Antibiotics Other (See Comments) and Anaphylaxis     unknown  And sulfites  And sulfites      Everolimus Other (See Comments) and Rash     unknown  Diffuse maculopapular rash requiring steroids  Began approximately one month after initiating everolimus  Diffuse maculopapular rash requiring steroids  Began approximately one month after initiating everolimus        [place] Oil - Food Allergy GI Intolerance     severe  severe      Coconut Oil - Food Allergy Other (See Comments)    Cranberry Extract - Food Allergy Other (See Comments)    Exemestane Other (See Comments)     unknown  Itching, rash  Itching, rash      Tricia - Food Allergy Other (See Comments)    Mangifera Indica Other (See Comments)    Monosodium Glutamate - Food Allergy Other (See Comments)    Other Other (See Comments)    Peanut Oil - Food Allergy Other (See Comments)       Social History:  Marital Status: /Civil Union   Patient Pre-hospital Living Situation: [place]  Patient Pre-hospital Level of Mobility: unable to be assessed at time of evaluation  Patient Pre-hospital Diet Restrictions:  None  Substance Use History:   Social History     Substance and Sexual Activity   Alcohol Use Not Currently     Social History     Tobacco Use   Smoking Status Never Smoker   Smokeless Tobacco Never Used     Social History     Substance and Sexual Activity   Drug Use Not Currently       Family History:  Family History   Problem Relation Age of Onset    Colorectal Cancer Sister        Physical Exam:     Vitals:   Blood Pressure: 125/85 (06/28/21 0749)  Pulse: (!) 114 (06/28/21 0749)  Temperature: 98 9 °F (37 2 °C) (06/28/21 0749)  Temp Source: Oral (06/28/21 0749)  Respirations: 18 (06/28/21 0749)  Height: 5' 3" (160 cm) (06/28/21 0749)  Weight - Scale: 68 5 kg (151 lb) (06/28/21 0749)  SpO2: 96 % (06/28/21 0749)    Physical Exam  Vitals and nursing note reviewed  Constitutional:       General: She is not in acute distress  Appearance: She is well-developed  She is ill-appearing  HENT:      Head: Normocephalic and atraumatic  Comments: Alopecia     Mouth/Throat:      Mouth: Mucous membranes are dry  Eyes:      Conjunctiva/sclera: Conjunctivae normal       Comments:  ptosis right eye   Cardiovascular:      Rate and Rhythm: Normal rate and regular rhythm  Heart sounds: No murmur heard  Pulmonary:      Effort: Pulmonary effort is normal  No respiratory distress  Breath sounds: Normal breath sounds  Abdominal:      General: There is no distension  Palpations: Abdomen is soft  Tenderness: There is no abdominal tenderness  There is no guarding  Musculoskeletal:         General: Normal range of motion  Cervical back: Neck supple  Skin:     General: Skin is warm and dry  Capillary Refill: Capillary refill takes less than 2 seconds  Neurological:      Mental Status: She is alert  Mental status is at baseline  Comments: Nonfocal neurological exam, blind at baseline with visualizations   Psychiatric:         Mood and Affect: Mood is anxious  Additional Data:     Lab Results:  Results from last 7 days   Lab Units 06/28/21  0121   WBC Thousand/uL 17 76*   HEMOGLOBIN g/dL 13 4   HEMATOCRIT % 40 0   PLATELETS Thousands/uL 282   NEUTROS PCT % 76*   LYMPHS PCT % 14   MONOS PCT % 9   EOS PCT % 0     Results from last 7 days   Lab Units 06/28/21  0208   SODIUM mmol/L 134*   POTASSIUM mmol/L 4 9   CHLORIDE mmol/L 98*   CO2 mmol/L 20*   BUN mg/dL 33*   CREATININE mg/dL 1 83*   ANION GAP mmol/L 16*   CALCIUM mg/dL 8 1*   ALBUMIN g/dL 2 4*   TOTAL BILIRUBIN mg/dL 2 77*   ALK PHOS U/L 565*   ALT U/L 178*   AST U/L 329*   GLUCOSE RANDOM mg/dL 139     Results from last 7 days   Lab Units 06/28/21  0208   INR  1 21*             Results from last 7 days   Lab Units 06/28/21  0316 06/28/21  0121   LACTIC ACID mmol/L 2 0 5 1*       Imaging: Reviewed radiology reports from this admission including: chest xray and [facility] report  XR chest 1 view portable   ED Interpretation by Edvin Moss DO (06/28 0250)   Questionable right base scar change      CT head without contrast   Final Result by Fernie Peralta MD (06/28 0245)      No acute intracranial abnormality  Scattered areas of sclerosis in the calvarium again noted concerning for osseous metastasis  Recommend MRI of brain without and with contrast for further evaluation in the appropriate clinical setting               Workstation performed: [id number]         CT abdomen pelvis wo contrast    (Results Pending)   US gallbladder (Results Pending)       EKG and Other Studies Reviewed on Admission:   · all    ** Please Note: This note has been constructed using a voice recognition system   **

## 2021-06-28 NOTE — ASSESSMENT & PLAN NOTE
Recently discharged from [facility] June 10th to [facility] after admission for visual hallucinations secondary to leptomeningeal disease of the optic chiasm, history radiation therapy without regaining vision  Permanent blindness    History-leptomeningeal disease of the optic chiasm on brain MRI in [facility], she received 5 fractions of radiation to the base of skull and sella, with last fraction 11/24/2020

## 2021-06-28 NOTE — CONSULTS
Consultation - General Surgery   Darius Keaen 77 y o  female MRN: [id number]  Unit/Bed#: -01 Encounter: [id number]    Assessment/Plan     Assessment:  Abdominal pain secondary to liver metastasis  Metastatic breast cancer to liver and bone  Patient currently on a regimen systemic chemotherapy  Elevated liver enzymes most likely secondary to liver metastasis  No gallstones on ultrasound  The gallbladder wall is slightly thickened this is most likely reactive changes  Plan:  No surgical intervention is needed at this time  Continue with conservative management with NPO with ice and IV fluids  We will be able to monitor labs and serial exams  History of Present Illness     HPI:  Darius Keane is a 77 y o  female who presents with complaint of right upper quadrant pain and dry heaves  Patient states that started yesterday and increased in severity  She states the pain is slightly decreased since she has been admitted  The patient has a history of metastatic breast cancer and most recently had a chemotherapy treatment on Saturday the patient denies any diarrhea or constipation  Patient does admit to occasional right upper quadrant pain in the past   Patient denies any cough or sputum production  She denies any extremity pain       Consults    Review of Systems   Constitutional: Positive for fatigue  Respiratory: Negative  Cardiovascular: Negative  Gastrointestinal: Positive for abdominal pain and nausea  Genitourinary: Negative  Musculoskeletal: Negative  Skin: Positive for pallor  Neurological: Negative  Psychiatric/Behavioral: Negative          Historical Information   Past Medical History:   Diagnosis Date    Acquired absence of bilateral breasts and nipples     Anxiety disorder     Breast implant status     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     Cortical blindness of right side of brain     Disorder of pituitary gland (HCC)     Dysarthria and anarthria     Insomnia, unspecified     [facility] )     Malignant neoplasm of left breast (HCC)     Polyneuropathy     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)     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)     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[facility] )     Secondary malignant neoplasm of other specified sites [facility])     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     Visual hallucination      History reviewed  No pertinent surgical history    Social History   Social History     Substance and Sexual Activity   Alcohol Use Not Currently     Social History     Substance and Sexual Activity   Drug Use Not Currently     E-Cigarette/Vaping    E-Cigarette Use Never User      E-Cigarette/Vaping Substances    Nicotine No     THC No     CBD No     Flavoring No     Other No     Unknown No      Social History     Tobacco Use   Smoking Status Never Smoker   Smokeless Tobacco Never Used     Family History:   Family History   Problem Relation Age of Onset    Colorectal Cancer Sister        Meds/Allergies   current meds:   Current Facility-Administered Medications   Medication Dose Route Frequency    aspirin (ECOTRIN LOW STRENGTH) EC tablet 81 mg  81 mg Oral Daily    FLUoxetine (PROzac) capsule 20 mg  20 mg Oral Daily    heparin (porcine) subcutaneous injection 5,000 Units  5,000 Units Subcutaneous Q8H [address]    hydrocortisone (CORTEF) tablet 10 mg  10 mg Oral Daily    hydrocortisone (CORTEF) tablet 5 mg  5 mg Oral Daily    levothyroxine tablet 50 mcg  50 mcg Oral Daily    melatonin tablet 6 mg  6 mg Oral HS    ondansetron (ZOFRAN) injection 4 mg  4 mg Intravenous Q8H PRN    piperacillin-tazobactam (ZOSYN) 2 25 g in sodium chloride 0 9 % 50 mL IVPB  2 25 g Intravenous Q6H    sodium chloride 0 9 % infusion  125 mL/hr Intravenous Continuous    [START ON 6/29/2021] vancomycin (VANCOCIN) IVPB (premix in dextrose) 1,000 mg 200 mL  15 mg/kg (Adjusted) Intravenous Q24H    and PTA meds:   Prior to Admission Medications   Prescriptions Last Dose Informant Patient Reported? Taking? FLUoxetine (PROzac) 20 mg capsule 6/27/2021 at Unknown time  Yes Yes   Sig: Take 20 mg by mouth daily   Melatonin 5 MG TABS 6/27/2021 at Unknown time  Yes Yes   Sig: Take by mouth   aspirin (ECOTRIN LOW STRENGTH) 81 mg EC tablet 6/27/2021 at Unknown time  Yes Yes   Sig: Take 81 mg by mouth daily   hydrocortisone (CORTEF) 10 mg tablet 6/27/2021 at Unknown time  Yes Yes   Sig: Take 10 mg by mouth daily   hydrocortisone (CORTEF) 5 mg tablet 6/27/2021 at Unknown time  Yes Yes   Sig: Take 5 mg by mouth daily   levothyroxine 50 mcg tablet 6/27/2021 at m  Yes Yes   Sig: Take 50 mcg by mouth daily      Facility-Administered Medications: None     Allergies   Allergen Reactions    Piper Anaphylaxis     Any Hot Pepper (Jalapeno, Habanero, etc )   Any Hot Pepper (Jalapeno, Habanero, etc )     Sulfa Antibiotics Other (See Comments) and Anaphylaxis     unknown  And sulfites  And sulfites      Everolimus Other (See Comments) and Rash     unknown  Diffuse maculopapular rash requiring steroids  Began approximately one month after initiating everolimus  Diffuse maculopapular rash requiring steroids  Began approximately one month after initiating everolimus        [place] Oil - Food Allergy GI Intolerance     severe  severe      Coconut Oil - Food Allergy Other (See Comments)    Cranberry Extract - Food Allergy Other (See Comments)    Exemestane Other (See Comments)     unknown  Itching, rash  Itching, rash      Tricia - Food Allergy Other (See Comments)    Mangifera Indica Other (See Comments)    Monosodium Glutamate - Food Allergy Other (See Comments)    Other Other (See Comments)    Peanut Oil - Food Allergy Other (See Comments)       Objective   First Vitals:   Blood Pressure: 133/88 (06/28/21 0119)  Pulse: (!) 119 (06/28/21 0119)  Temperature: 97 5 °F (36 4 °C) (06/28/21 0119)  Temp Source: Temporal (06/28/21 0119)  Respirations: 22 (06/28/21 0119)  Height: 5' 3" (160 cm) (06/28/21 0749)  Weight - Scale: 68 7 kg (151 lb 7 3 oz) (06/28/21 0119)  SpO2: 96 % (06/28/21 0119)    Current Vitals:   Blood Pressure: 125/85 (06/28/21 0749)  Pulse: (!) 114 (06/28/21 0749)  Temperature: 98 9 °F (37 2 °C) (06/28/21 0749)  Temp Source: Oral (06/28/21 0749)  Respirations: 18 (06/28/21 0749)  Height: 5' 3" (160 cm) (06/28/21 0749)  Weight - Scale: 68 5 kg (151 lb) (06/28/21 0749)  SpO2: 96 % (06/28/21 0749)      Intake/Output Summary (Last 24 hours) at 6/28/2021 1122  Last data filed at 6/28/2021 0815  Gross per 24 hour   Intake 3450 ml   Output 910 ml   Net 2540 ml       Invasive Devices     Peripheral Intravenous Line            Peripheral IV 06/28/21 Right Wrist <1 day    Peripheral IV 06/28/21 Right Wrist <1 day          Drain            Urethral Catheter Straight-tip 16 Fr  <1 day                Physical Exam  Constitutional:       Appearance: Normal appearance  HENT:      Head: Normocephalic and atraumatic  Mouth/Throat:      Mouth: Mucous membranes are dry  Eyes:      Extraocular Movements: Extraocular movements intact  Conjunctiva/sclera: Conjunctivae normal       Pupils: Pupils are equal, round, and reactive to light  Cardiovascular:      Rate and Rhythm: Normal rate and regular rhythm  Pulses: Normal pulses  Heart sounds: Normal heart sounds  Pulmonary:      Effort: Pulmonary effort is normal       Breath sounds: Normal breath sounds  Abdominal:      General: Abdomen is flat  Palpations: There is no mass  Tenderness: There is abdominal tenderness  There is no rebound  Hernia: No hernia is present  Comments: Tender at right upper quadrant/ no masses   Musculoskeletal:         General: Normal range of motion  Cervical back: Normal range of motion and neck supple  Skin:     General: Skin is warm and dry     Neurological: Mental Status: She is alert and oriented to person, place, and time  Psychiatric:         Mood and Affect: Mood normal          Behavior: Behavior normal          Lab Results:   CBC:   Lab Results   Component Value Date    WBC 17 76 (H) 06/28/2021    HGB 13 4 06/28/2021    HCT 40 0 06/28/2021    MCV 97 06/28/2021     06/28/2021    MCH 32 6 06/28/2021    MCHC 33 5 06/28/2021    RDW 18 7 (H) 06/28/2021    MPV 10 4 06/28/2021    NRBC 0 06/28/2021   , CMP:   Lab Results   Component Value Date    SODIUM 134 (L) 06/28/2021    K 4 9 06/28/2021    CL 98 (L) 06/28/2021    CO2 20 (L) 06/28/2021    BUN 33 (H) 06/28/2021    CREATININE 1 83 (H) 06/28/2021    CALCIUM 8 1 (L) 06/28/2021     (H) 06/28/2021     (H) 06/28/2021    ALKPHOS 565 (H) 06/28/2021    EGFR 28 06/28/2021     Imaging: I have personally reviewed pertinent films in PACS with a Radiologist   EKG, Pathology, and Other Studies: I have personally reviewed pertinent films in PACS    Counseling / Coordination of Care  Total floor / unit time spent today 35 minutes  Greater than 50% of total time was spent with the patient and / or family counseling and / or coordination of care  A description of the counseling / coordination of care: ice chips and serial labs and discussed with Dr Lisa Byrd

## 2021-06-28 NOTE — ED PROVIDER NOTES
History  Chief Complaint   Patient presents with    Vomiting     nausea and vomiting for 2-3 days, zofran not helping  from [facility] rehab   last chemo tx 3 weeks ago  57-year-old female complains of severe nausea and vomiting worse over the past few days  Facility staff notes emesis appeared coffee-ground  EMS staff states she is complaining with right-sided flank pain  Last had chemotherapy 3 weeks ago  Daughter notes recently at [facility], hospitalized for a week after chemotherapy secondary to vomiting  Daughter notes msg allergy unsure if similar type of vomiting related, notes [facility] staff suggested restarting Reglan  She denies headache  She denies chest pain dyspnea  Past medical history includes metastatic breast cancer status post mastectomy, dysarthria, blindness, vocal cord paralysis, anxiety disorder      History provided by:  Patient, EMS personnel and nursing home  History limited by:  Acuity of condition  Vomiting  Severity:  Severe  Timing:  Constant  Quality:  Coffee grounds  Progression:  Worsening  Chronicity:  New  Ineffective treatments:  Antiemetics  Associated symptoms: abdominal pain        Prior to Admission Medications   Prescriptions Last Dose Informant Patient Reported? Taking?    FLUoxetine (PROzac) 20 mg capsule 6/27/2021 at Unknown time  Yes Yes   Sig: Take 20 mg by mouth daily   Melatonin 5 MG TABS 6/27/2021 at Unknown time  Yes Yes   Sig: Take by mouth   aspirin (ECOTRIN LOW STRENGTH) 81 mg EC tablet 6/27/2021 at Unknown time  Yes Yes   Sig: Take 81 mg by mouth daily   hydrocortisone (CORTEF) 10 mg tablet 6/27/2021 at Unknown time  Yes Yes   Sig: Take 10 mg by mouth daily   hydrocortisone (CORTEF) 5 mg tablet 6/27/2021 at Unknown time  Yes Yes   Sig: Take 5 mg by mouth daily   levothyroxine 50 mcg tablet 6/27/2021 at m  Yes Yes   Sig: Take 50 mcg by mouth daily      Facility-Administered Medications: None       Past Medical History:   Diagnosis Date    Acquired absence of bilateral breasts and nipples     Anxiety disorder     Breast implant status     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     Cortical blindness of right side of brain     Disorder of pituitary gland (HCC)     Dysarthria and anarthria     Insomnia, unspecified     [facility] lung [facility]     Malignant neoplasm of left breast (HCC)     Polyneuropathy     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)     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)     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[facility] )     Secondary malignant neoplasm of other specified sites ([facility] )     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     Visual hallucination        History reviewed  No pertinent surgical history  History reviewed  No pertinent family history  I have reviewed and agree with the history as documented  E-Cigarette/Vaping    E-Cigarette Use Never User      E-Cigarette/Vaping Substances    Nicotine No     THC No     CBD No     Flavoring No     Other No     Unknown No      Social History     Tobacco Use    Smoking status: Never Smoker    Smokeless tobacco: Never Used   Vaping Use    Vaping Use: Never used   Substance Use Topics    Alcohol use: Not Currently    Drug use: Not Currently       Review of Systems   Gastrointestinal: Positive for abdominal pain and vomiting  All other systems reviewed and are negative  Physical Exam  Physical Exam  Vitals and nursing note reviewed  Constitutional:       Comments: Intermittently gagging into emesis bag   HENT:      Head: Normocephalic and atraumatic  Comments: Generalized alopecia  Eyes:      Conjunctiva/sclera: Conjunctivae normal       Comments: Right eye ptosis, pupils round 3-4 mm, symmetric,  reactive   Cardiovascular:      Rate and Rhythm: Normal rate and regular rhythm  Heart sounds: Normal heart sounds     Pulmonary: Effort: Pulmonary effort is normal       Breath sounds: Normal breath sounds  Abdominal:      General: Bowel sounds are normal  There is no distension  Palpations: Abdomen is soft  Tenderness: There is abdominal tenderness  Comments: Minimally tender, daughter notes mildly distended   Musculoskeletal:         General: Normal range of motion  Cervical back: Neck supple  No rigidity  Skin:     General: Skin is warm and dry  Neurological:      Mental Status: She is alert and oriented to person, place, and time  Cranial Nerves: No cranial nerve deficit  Coordination: Coordination normal    Psychiatric:         Behavior: Behavior normal          Thought Content:  Thought content normal          Judgment: Judgment normal          Vital Signs  ED Triage Vitals [06/28/21 0119]   Temperature Pulse Respirations Blood Pressure SpO2   97 5 °F (36 4 °C) (!) 119 22 133/88 96 %      Temp Source Heart Rate Source Patient Position - Orthostatic VS BP Location FiO2 (%)   Temporal Monitor Lying Right arm --      Pain Score       Worst Possible Pain           Vitals:    06/28/21 0215 06/28/21 0300 06/28/21 0408 06/28/21 0502   BP: 142/63 141/82 137/82 115/68   Pulse: (!) 107 103 105 (!) 109   Patient Position - Orthostatic VS: Lying Lying Lying Lying         Visual Acuity      ED Medications  Medications   sodium chloride 0 9 % infusion (0 mL/hr Intravenous Stopped 6/28/21 0408)   ondansetron (ZOFRAN) 8 mg in sodium chloride 0 9 % 50 mL IVPB (0 mg Intravenous Stopped 6/28/21 0150)   diphenhydrAMINE (BENADRYL) injection 50 mg (50 mg Intravenous Given 6/28/21 0124)   pantoprazole (PROTONIX) 80 mg in sodium chloride 0 9 % 100 mL IVPB (0 mg Intravenous Stopped 6/28/21 0215)   metoclopramide (REGLAN) injection 10 mg (10 mg Intravenous Given 6/28/21 0234)   dexamethasone (PF) (DECADRON) injection 10 mg (10 mg Intravenous Given 6/28/21 0234)   sodium chloride 0 9 % bolus 2,000 mL (0 mL Intravenous Stopped 6/28/21 0512)   piperacillin-tazobactam (ZOSYN) 3 375 g in sodium chloride 0 9 % 100 mL IVPB (3 375 g Intravenous New Bag 6/28/21 0523)   vancomycin (VANCOCIN) IVPB (premix in dextrose) 1,000 mg 200 mL (0 mg/kg × 68 7 kg Intravenous Stopped 6/28/21 0511)       Diagnostic Studies  Results Reviewed     Procedure Component Value Units Date/Time    Lactic acid 2 Hours [[id number]]  (Normal) Collected: 06/28/21 0316    Lab Status: Final result Specimen: Blood from Hand, Right Updated: 06/28/21 0357     LACTIC ACID 2 0 mmol/L     Narrative:      Result may be elevated if tourniquet was used during collection  Urine Microscopic [[id number]]  (Abnormal) Collected: 06/28/21 0247    Lab Status: Final result Specimen: Urine, Straight Cath Updated: 06/28/21 0356     RBC, UA 4-10 /hpf      WBC, UA 0-5 /hpf      Epithelial Cells Occasional /hpf      Bacteria, UA Innumerable /hpf      Hyaline Casts, UA Innumerable /lpf      COARSE GRANULAR CASTS 0-3 /lpf      OTHER OBSERVATIONS Yeast Cells Present  Renal Tubule Epithelial Cells Present    Urine culture [[id number]] Collected: 06/28/21 0247    Lab Status: In process Specimen: Urine, Straight Cath Updated: 06/28/21 0354    UA w Reflex to Microscopic w Reflex to Culture [[id number]]  (Abnormal) Collected: 06/28/21 0247    Lab Status: Final result Specimen: Urine, Straight Cath Updated: 06/28/21 0306     Color, UA Orange     Clarity, UA Clear     Specific Gravity, UA 1 025     pH, UA 5 5     Leukocytes, UA Trace     Nitrite, UA Negative     Protein,  (2+) mg/dl      Glucose,  (1/10%) mg/dl      Ketones, UA 15 (1+) mg/dl      Urobilinogen, UA >=8 0 E U /dl      Bilirubin, UA Moderate     Blood, UA Trace-lysed    Blood culture #1 [[id number]] Collected: 06/28/21 0239    Lab Status:  In process Specimen: Blood from Hand, Right Updated: 06/28/21 0252    Comprehensive metabolic panel [[id number]]  (Abnormal) Collected: 06/28/21 0208    Lab Status: Final result Specimen: Blood from Arm, Right Updated: 06/28/21 0234     Sodium 134 mmol/L      Potassium 4 9 mmol/L      Chloride 98 mmol/L      CO2 20 mmol/L      ANION GAP 16 mmol/L      BUN 33 mg/dL      Creatinine 1 83 mg/dL      Glucose 139 mg/dL      Calcium 8 1 mg/dL      Corrected Calcium 9 4 mg/dL       U/L       U/L      Alkaline Phosphatase 565 U/L      Total Protein 5 6 g/dL      Albumin 2 4 g/dL      Total Bilirubin 2 77 mg/dL      eGFR 28 ml/min/1 73sq m     Narrative:      [place] guidelines for Chronic Kidney Disease (CKD):     Stage 1 with normal or high GFR (GFR > 90 mL/min/1 73 square meters)    Stage 2 Mild CKD (GFR = 60-89 mL/min/1 73 square meters)    Stage 3A Moderate CKD (GFR = 45-59 mL/min/1 73 square meters)    Stage 3B Moderate CKD (GFR = 30-44 mL/min/1 73 square meters)    Stage 4 Severe CKD (GFR = 15-29 mL/min/1 73 square meters)    Stage 5 End Stage CKD (GFR <15 mL/min/1 73 square meters)  Note: GFR calculation is accurate only with a steady state creatinine    Blood culture #2 [[id number]] Collected: 06/28/21 0224    Lab Status: In process Specimen: Blood from Arm, Right Updated: 06/28/21 0230    Protime-INR [[id number]]  (Abnormal) Collected: 06/28/21 0208    Lab Status: Final result Specimen: Blood from Arm, Right Updated: 06/28/21 0229     Protime 15 1 seconds      INR 1 21    APTT [[id number]]  (Normal) Collected: 06/28/21 0208    Lab Status: Final result Specimen: Blood from Arm, Right Updated: 06/28/21 0229     PTT 27 seconds     Lactic acid [[id number]]  (Abnormal) Collected: 06/28/21 0121    Lab Status: Final result Specimen: Blood from Arm, Right Updated: 06/28/21 0201     LACTIC ACID 5 1 mmol/L     Narrative:      Result may be elevated if tourniquet was used during collection      CBC and differential [[id number]]  (Abnormal) Collected: 06/28/21 0121    Lab Status: Final result Specimen: Blood from Arm, Right Updated: 06/28/21 0128     WBC 17 76 Thousand/uL      RBC 4 11 Million/uL      Hemoglobin 13 4 g/dL      Hematocrit 40 0 %      MCV 97 fL      MCH 32 6 pg      MCHC 33 5 g/dL      RDW 18 7 %      MPV 10 4 fL      Platelets 207 Thousands/uL      nRBC 0 /100 WBCs      Neutrophils Relative 76 %      Immat GRANS % 1 %      Lymphocytes Relative 14 %      Monocytes Relative 9 %      Eosinophils Relative 0 %      Basophils Relative 0 %      Neutrophils Absolute 13 49 Thousands/µL      Immature Grans Absolute 0 19 Thousand/uL      Lymphocytes Absolute 2 39 Thousands/µL      Monocytes Absolute 1 63 Thousand/µL      Eosinophils Absolute 0 01 Thousand/µL      Basophils Absolute 0 05 Thousands/µL                  XR chest 1 view portable   ED Interpretation by Raudel Mitchell DO (06/28 0250)   Questionable right base scar change      CT head without contrast   Final Result by Semaj Ireland MD (06/28 0245)      No acute intracranial abnormality  Scattered areas of sclerosis in the calvarium again noted concerning for osseous metastasis  Recommend MRI of brain without and with contrast for further evaluation in the appropriate clinical setting               Workstation performed: [id number]         CT abdomen pelvis wo contrast    (Results Pending)              Procedures  CriticalCare Time  Performed by: Raudel Mitchell DO  Authorized by: Raudel Mitchell DO     Critical care provider statement:     Critical care time (minutes):  30    Critical care was necessary to treat or prevent imminent or life-threatening deterioration of the following conditions:  Sepsis    Critical care was time spent personally by me on the following activities:  Obtaining history from patient or surrogate, development of treatment plan with patient or surrogate, discussions with consultants, evaluation of patient's response to treatment, examination of patient, ordering and performing treatments and interventions, ordering and review of laboratory studies, ordering and review of radiographic studies, re-evaluation of patient's condition and review of old charts             ED Course  ED Course as of Jun 28 0554   Mon Jun 28, 2021   0129 WBC(!): 17 76   0218 Sepsis care and interventions discussed at length with daughter, plans to continue with full oncologic treatments, no plans for palliative or comfort care, only recently too weak and not tolerating  She will remain full code    LACTIC ACID(!!): 5 1   0248 Creatinine(!): 1 83   0249 AST(!): 329   0249 ALT(!): 178   0249 Alkaline Phosphatase(!): 565   0249 TOTAL BILIRUBIN(!): 2 77   0313 Leukocytes, UA(!): Trace   0313 Ketones, UA(!): 15 (1+)   0412 Bacteria, UA(!): Innumerable   0412 RBC, UA(!): 4-10   0412 LACTIC ACID: 2 0   0422 IMPRESSION:  1  Hepatic hypodensities are incompletely characterized  Metastatic disease cannot be excluded  2  Faint hyperdensity in the gallbladder may represent gallstones/sludge  Pericholecystic fluid may be  related to ascites or acute cholecystitis  Right upper quadrant ultrasound may be performed  3  Mildly thickened small bowel loops in the left hemiabdomen  Mild adjacent mesenteric edema  Borderline dilated loop of proximal jejunum  Findings may be related to focal ileus or enteritis  4  Wall thickening in the visualized distal esophagus  Esophagitis cannot be excluded  5  A nodular hepatic contour may indicate cirrhosis  Mild abdominopelvic ascites  6  Findings concerning for osseous metastases     CT abdomen pelvis wo contrast   3323 Results discussed with daughter and requests [facility]  Notes no nausea or abdominal pain, abdomen benign, states urinary catheter is irritating would like removed      [address] Transfer center [address] transfer center not currently open  Discussed with daughter and agreeable with [facility] admission  [facility] [place] agreeable with US this am and Hospitalist admit, Ramana contacted                                SBIRT 20yo+      Most Recent Value   SBIRT (22 yo +)   In order to provide better care to our patients, we are screening all of our patients for alcohol and drug use  Would it be okay to ask you these screening questions? Unable to answer at this time Filed at: 06/28/2021 0120                    MDM    Disposition  Final diagnoses:   Nausea & vomiting   JANNY (acute kidney injury) ([facility] [address] )   Elevated LFTs   Gastritis   [facility] - UTI, possible cholecystitis     Time reflects when diagnosis was documented in both MDM as applicable and the Disposition within this note     Time User Action Codes Description Comment    6/28/2021  3:48 AM [place] Cow[place] Add [R11 2] Nausea & vomiting     6/28/2021  3:48 AM [place] Cow[place] Add [N17 9] JANNY (acute kidney injury) ([facility] [address] )     6/28/2021  3:48 AM [place] Add [R79 89] Elevated LFTs     6/28/2021  3:48 AM [place] Add [K29 70] Gastritis     6/28/2021  5:52 AM [place] Add [A41 9] Sepsis ([facility] [address] )     6/28/2021  5:52 AM [place] Cowden Modify [A41 9] Sepsis [facility]) UTI, possible cholecystitis      ED Disposition     ED Disposition Condition Date/Time Comment    Admit Stable Mon Jun 28, 2021  5:49 AM Discussed with Ramana full admission to [facility]        Follow-up Information    None         Patient's Medications   Discharge Prescriptions    No medications on file     No discharge procedures on file      PDMP Review     None          ED Provider  Electronically Signed by           Zack Singh DO  06/28/21 5585

## 2021-06-28 NOTE — Clinical Note
Marcus Barkley should be transferred out to [facility]

## 2021-06-28 NOTE — ASSESSMENT & PLAN NOTE
· Elevated liver enzymes, review of records there was no elevation at last hospitalization  · Possibly secondary to sepsis versus gallstone pathology  · Abdominal ultrasound pending  · Abdominal exam benign  · Trend transaminase levels with resolution of sepsis

## 2021-06-28 NOTE — ASSESSMENT & PLAN NOTE
· POA from skilled nursing facility with abdominal pain/right flank pain and vomiting  · Lactic acid 5 1, WBC 17, JANNY, tachycardia  · Fluid resuscitation for ideal body weight with BMI 26 75  · Empiric antibiotic therapy with Zosyn and vancomycin  · Pyuria on catheterized specimen-urine culture pending  · Blood cultures pending  · CT abdomen and pelvis with questionable gallstones, ileus/enteritis  · Ultrasound abdomen pending  · History metastatic breast cancer continuing chemotherapy [facility] last dose 3 weeks ago  · Currently physical rehab at skilled nursing facility with full intention of continuing oncological treatment  · Trend fever curve, leukocytosis, procalcitonin

## 2021-06-28 NOTE — ASSESSMENT & PLAN NOTE
· Creatinine 1 83 on admission with most recent creatinine 0 53 on 06/06/2021  · Continue fluid resuscitation most likely pre renal with vomiting and abdominal pain  · Renally dose medications  · Urinalysis with pyuria  · Empiric antibiotic therapy pending urine culture results  · Trend creatinine

## 2021-06-28 NOTE — PLAN OF CARE
Problem: Potential for Falls  Goal: Patient will remain free of falls  Description: INTERVENTIONS:  - Educate patient/family on patient safety including physical limitations  - Instruct patient to call for assistance with activity   - Consult OT/PT to assist with strengthening/mobility   - Keep Call bell within reach  - Keep bed low and locked with side rails adjusted as appropriate  - Keep care items and personal belongings within reach  - Initiate and maintain comfort rounds  - Make Fall Risk Sign visible to staff  - Offer Toileting every 2 Hours, in advance of need  - Initiate/Maintain bed and chair alarm  - Apply yellow socks and bracelet for high fall risk patients  - Consider moving patient to room near nurses station  6/28/2021 0754 by Elton Hurst RN  Outcome: Progressing  6/28/2021 0753 by Elton Hurst RN  Outcome: Progressing     Problem: PAIN - ADULT  Goal: Verbalizes/displays adequate comfort level or baseline comfort level  Description: Interventions:  - Encourage patient to monitor pain and request assistance  - Assess pain using appropriate pain scale  - Administer analgesics based on type and severity of pain and evaluate response  - Implement non-pharmacological measures as appropriate and evaluate response  - Consider cultural and social influences on pain and pain management  - Notify physician/advanced practitioner if interventions unsuccessful or patient reports new pain  6/28/2021 0754 by Elton Hurst RN  Outcome: Progressing  6/28/2021 0753 by Elton Hurst RN  Outcome: Progressing     Problem: INFECTION - ADULT  Goal: Absence or prevention of progression during hospitalization  Description: INTERVENTIONS:  - Assess and monitor for signs and symptoms of infection  - Monitor lab/diagnostic results  - Monitor all insertion sites, i e  indwelling lines, tubes, and drains  - Monitor endotracheal if appropriate and nasal secretions for changes in amount and color  - [place] appropriate cooling/warming therapies per order  - Administer medications as ordered  - Instruct and encourage patient and family to use good hand hygiene technique  - Identify and instruct in appropriate isolation precautions for identified infection/condition  6/28/2021 0754 by Kamryn Coles RN  Outcome: Progressing  6/28/2021 0753 by Kamryn Coles RN  Outcome: Progressing  Goal: Absence of fever/infection during neutropenic period  Description: INTERVENTIONS:  - Monitor WBC    6/28/2021 0754 by Kamryn Coles RN  Outcome: Progressing  6/28/2021 0753 by Kamryn Coles RN  Outcome: Progressing     Problem: SAFETY ADULT  Goal: Patient will remain free of falls  Description: INTERVENTIONS:  - Educate patient/family on patient safety including physical limitations  - Instruct patient to call for assistance with activity   - Consult OT/PT to assist with strengthening/mobility   - Keep Call bell within reach  - Keep bed low and locked with side rails adjusted as appropriate  - Keep care items and personal belongings within reach  - Initiate and maintain comfort rounds  - Make Fall Risk Sign visible to staff  - Apply yellow socks and bracelet for high fall risk patients  - Consider moving patient to room near nurses station  6/28/2021 0754 by Kamryn Coles RN  Outcome: Progressing  6/28/2021 0753 by Kamryn Coles RN  Outcome: Progressing  Goal: Maintain or return to baseline ADL function  Description: INTERVENTIONS:  -  Assess patient's ability to carry out ADLs; assess patient's baseline for ADL function and identify physical deficits which impact ability to perform ADLs (bathing, care of mouth/teeth, toileting, grooming, dressing, etc )  - Assess/evaluate cause of self-care deficits   - Assess range of motion  - Assess patient's mobility; develop plan if impaired  - Assess patient's need for assistive devices and provide as appropriate  - Encourage maximum independence but intervene and supervise when necessary  - Involve family in performance of ADLs  - Assess for home care needs following discharge   - Consider OT consult to assist with ADL evaluation and planning for discharge  - Provide patient education as appropriate  6/28/2021 0754 by Nancy Coles RN  Outcome: Progressing  6/28/2021 0753 by Nancy Coles RN  Outcome: Progressing  Goal: Maintains/Returns to pre admission functional level  Description: INTERVENTIONS:  - Perform BMAT or MOVE assessment daily    - Set and communicate daily mobility goal to care team and patient/family/caregiver  - Collaborate with rehabilitation services on mobility goals if consulted  - Out of bed for toileting  - Record patient progress and toleration of activity level   6/28/2021 0754 by Nancy Coles RN  Outcome: Progressing  6/28/2021 0753 by Nancy Coles RN  Outcome: Progressing     Problem: DISCHARGE PLANNING  Goal: Discharge to home or other facility with appropriate resources  Description: INTERVENTIONS:  - Identify barriers to discharge w/patient and caregiver  - Arrange for needed discharge resources and transportation as appropriate  - Identify discharge learning needs (meds, wound care, etc )  - Arrange for interpretive services to assist at discharge as needed  - Refer to Case Management Department for coordinating discharge planning if the patient needs post-hospital services based on physician/advanced practitioner order or complex needs related to functional status, cognitive ability, or social support system  6/28/2021 0754 by Nancy Coles RN  Outcome: Progressing  6/28/2021 0753 by Nancy Coles RN  Outcome: Progressing     Problem: Knowledge Deficit  Goal: Patient/family/caregiver demonstrates understanding of disease process, treatment plan, medications, and discharge instructions  Description: Complete learning assessment and assess knowledge base    Interventions:  - Provide teaching at level of understanding  - Provide teaching via preferred learning methods  6/28/2021 0754 by Alvena Eisenmenger, RN  Outcome: Progressing  6/28/2021 0753 by Alvena Eisenmenger, RN  Outcome: Progressing     Problem: GASTROINTESTINAL - ADULT  Goal: Minimal or absence of nausea and/or vomiting  Description: INTERVENTIONS:  - Administer IV fluids if ordered to ensure adequate hydration  - Maintain NPO status until nausea and vomiting are resolved  - Administer ordered antiemetic medications as needed  - Provide nonpharmacologic comfort measures as appropriate  - Advance diet as tolerated, if ordered  - Consider nutrition services referral to assist patient with adequate nutrition and appropriate food choices  6/28/2021 0754 by Alvena Eisenmenger, RN  Outcome: Progressing  6/28/2021 0753 by Alvena Eisenmenger, RN  Outcome: Progressing     Problem: GENITOURINARY - ADULT  Goal: Maintains or returns to baseline urinary function  Description: INTERVENTIONS:  - Assess urinary function  - Encourage oral fluids to ensure adequate hydration if ordered  - Administer IV fluids as ordered to ensure adequate hydration  - Administer ordered medications as needed  - Offer frequent toileting  - Follow urinary retention protocol if ordered  6/28/2021 0754 by Alvena Eisenmenger, RN  Outcome: Progressing  6/28/2021 0753 by Alvena Eisenmenger, RN  Outcome: Progressing  Goal: Urinary catheter remains patent  Description: INTERVENTIONS:  - Assess patency of urinary catheter  - If patient has a chronic mayorga, consider changing catheter if non-functioning  - Follow guidelines for intermittent irrigation of non-functioning urinary catheter  6/28/2021 0754 by Alvena Eisenmenger, RN  Outcome: Progressing  6/28/2021 0753 by Alvena Eisenmenger, RN  Outcome: Progressing     Problem: SKIN/TISSUE INTEGRITY - ADULT  Goal: Skin Integrity remains intact(Skin Breakdown Prevention)  Description: Assess:  -Perform Danny assessment every shift  -Clean and moisturize skin every shift  -Inspect skin when repositioning, toileting, and assisting with ADLS  -Assess extremities for adequate circulation and sensation     Bed Management:  -Have minimal linens on bed & keep smooth, unwrinkled  -Change linens as needed when moist or perspiring      Toileting:  -Offer bedside commode  -Assess for incontinence every 2 H    Activity:  -Encourage activity and walks on unit  -Encourage or provide ROM exercises   -Turn and reposition patient every 2 Hours  -Use appropriate equipment to lift or move patient in bed  -Instruct/ Assist with weight shifting every when out of bed in chair  -Consider limitation of chair time hour intervals    Skin Care:  -Avoid use of baby powder, tape, friction and shearing, hot water or constrictive clothing  -Do not massage red bony areas    6/28/2021 0754 by Flakita Estevez RN  Outcome: Progressing  6/28/2021 0753 by Flakita Estevez RN  Outcome: Progressing

## 2021-06-28 NOTE — ASSESSMENT & PLAN NOTE
· History left ductal carcinoma diagnosed 2007 with subsequent surgical resection, radiation, chemotherapy most recent dose 3 weeks ago  · Hospitalized at [facility], discharged June 10th to [facility] skilled nursing facility for rehabilitation after falls and weakness with full intention of resuming chemotherapy and full oncological treatment    · Hepatic, lung and bony metastases  · Please see overview for oncology history treatment summary for further details  · Continue outpatient follow-up

## 2021-06-28 NOTE — PLAN OF CARE
Problem: Potential for Falls  Goal: Patient will remain free of falls  Description: INTERVENTIONS:  - Educate patient/family on patient safety including physical limitations  - Instruct patient to call for assistance with activity   - Consult OT/PT to assist with strengthening/mobility   - Keep Call bell within reach  - Keep bed low and locked with side rails adjusted as appropriate  - Keep care items and personal belongings within reach  - Initiate and maintain comfort rounds  - Make Fall Risk Sign visible to staff  - Offer Toileting every   Hours, in advance of need  - Initiate/Maintain   alarm  - Obtain necessary fall risk management equipment:     - Apply yellow socks and bracelet for high fall risk patients  - Consider moving patient to room near nurses station  Outcome: Progressing     Problem: PAIN - ADULT  Goal: Verbalizes/displays adequate comfort level or baseline comfort level  Description: Interventions:  - Encourage patient to monitor pain and request assistance  - Assess pain using appropriate pain scale  - Administer analgesics based on type and severity of pain and evaluate response  - Implement non-pharmacological measures as appropriate and evaluate response  - Consider cultural and social influences on pain and pain management  - Notify physician/advanced practitioner if interventions unsuccessful or patient reports new pain  Outcome: Progressing     Problem: INFECTION - ADULT  Goal: Absence or prevention of progression during hospitalization  Description: INTERVENTIONS:  - Assess and monitor for signs and symptoms of infection  - Monitor lab/diagnostic results  - Monitor all insertion sites, i e  indwelling lines, tubes, and drains  - Monitor endotracheal if appropriate and nasal secretions for changes in amount and color  - [place] appropriate cooling/warming therapies per order  - Administer medications as ordered  - Instruct and encourage patient and family to use good hand hygiene technique  - Identify and instruct in appropriate isolation precautions for identified infection/condition  Outcome: Progressing  Goal: Absence of fever/infection during neutropenic period  Description: INTERVENTIONS:  - Monitor WBC    Outcome: Progressing     Problem: SAFETY ADULT  Goal: Patient will remain free of falls  Description: INTERVENTIONS:  - Educate patient/family on patient safety including physical limitations  - Instruct patient to call for assistance with activity   - Consult OT/PT to assist with strengthening/mobility   - Keep Call bell within reach  - Keep bed low and locked with side rails adjusted as appropriate  - Keep care items and personal belongings within reach  - Initiate and maintain comfort rounds  - Make Fall Risk Sign visible to staff  - Offer Toileting every   Hours, in advance of need  - Initiate/Maintain   alarm  - Obtain necessary fall risk management equipment:     - Apply yellow socks and bracelet for high fall risk patients  - Consider moving patient to room near nurses station  Outcome: Progressing  Goal: Maintain or return to baseline ADL function  Description: INTERVENTIONS:  -  Assess patient's ability to carry out ADLs; assess patient's baseline for ADL function and identify physical deficits which impact ability to perform ADLs (bathing, care of mouth/teeth, toileting, grooming, dressing, etc )  - Assess/evaluate cause of self-care deficits   - Assess range of motion  - Assess patient's mobility; develop plan if impaired  - Assess patient's need for assistive devices and provide as appropriate  - Encourage maximum independence but intervene and supervise when necessary  - Involve family in performance of ADLs  - Assess for home care needs following discharge   - Consider OT consult to assist with ADL evaluation and planning for discharge  - Provide patient education as appropriate  Outcome: Progressing  Goal: Maintains/Returns to pre admission functional level  Description: INTERVENTIONS:  - Perform BMAT or MOVE assessment daily    - Set and communicate daily mobility goal to care team and patient/family/caregiver  - Collaborate with rehabilitation services on mobility goals if consulted  - Perform Range of Motion   times a day  - Reposition patient every   hours  - Dangle patient   times a day  - Stand patient   times a day  - Ambulate patient   times a day  - Out of bed to chair   times a day   - Out of bed for meals   times a day  - Out of bed for toileting  - Record patient progress and toleration of activity level   Outcome: Progressing     Problem: DISCHARGE PLANNING  Goal: Discharge to home or other facility with appropriate resources  Description: INTERVENTIONS:  - Identify barriers to discharge w/patient and caregiver  - Arrange for needed discharge resources and transportation as appropriate  - Identify discharge learning needs (meds, wound care, etc )  - Arrange for interpretive services to assist at discharge as needed  - Refer to Case Management Department for coordinating discharge planning if the patient needs post-hospital services based on physician/advanced practitioner order or complex needs related to functional status, cognitive ability, or social support system  Outcome: Progressing     Problem: Knowledge Deficit  Goal: Patient/family/caregiver demonstrates understanding of disease process, treatment plan, medications, and discharge instructions  Description: Complete learning assessment and assess knowledge base    Interventions:  - Provide teaching at level of understanding  - Provide teaching via preferred learning methods  Outcome: Progressing     Problem: GASTROINTESTINAL - ADULT  Goal: Minimal or absence of nausea and/or vomiting  Description: INTERVENTIONS:  - Administer IV fluids if ordered to ensure adequate hydration  - Maintain NPO status until nausea and vomiting are resolved  - Administer ordered antiemetic medications as needed  - Provide nonpharmacologic comfort measures as appropriate  - Advance diet as tolerated, if ordered  - Consider nutrition services referral to assist patient with adequate nutrition and appropriate food choices  Outcome: Progressing     Problem: GENITOURINARY - ADULT  Goal: Maintains or returns to baseline urinary function  Description: INTERVENTIONS:  - Assess urinary function  - Encourage oral fluids to ensure adequate hydration if ordered  - Administer IV fluids as ordered to ensure adequate hydration  - Administer ordered medications as needed  - Offer frequent toileting  - Follow urinary retention protocol if ordered  Outcome: Progressing  Goal: Urinary catheter remains patent  Description: INTERVENTIONS:  - Assess patency of urinary catheter  - If patient has a chronic mayorga, consider changing catheter if non-functioning  - Follow guidelines for intermittent irrigation of non-functioning urinary catheter  Outcome: Progressing     Problem: SKIN/TISSUE INTEGRITY - ADULT  Goal: Skin Integrity remains intact(Skin Breakdown Prevention)  Description: Assess:  -Perform Danny assessment every    -Clean and moisturize skin every    -Inspect skin when repositioning, toileting, and assisting with ADLS  -Assess under medical devices such as   every    -Assess extremities for adequate circulation and sensation     Bed Management:  -Have minimal linens on bed & keep smooth, unwrinkled  -Change linens as needed when moist or perspiring  -Avoid sitting or lying in one position for more than   hours while in bed  -Keep HOB at  degrees     Toileting:  -Offer bedside commode  -Assess for incontinence every   -Use incontinent care products after each incontinent episode such as   Activity:  -Mobilize patient   times a day  -Encourage activity and walks on unit  -Encourage or provide ROM exercises   -Turn and reposition patient every    Hours  -Use appropriate equipment to lift or move patient in bed  -Instruct/ Assist with weight shifting every   when out of bed in chair  -Consider limitation of chair time   hour intervals    Skin Care:  -Avoid use of baby powder, tape, friction and shearing, hot water or constrictive clothing  -Relieve pressure over bony prominences using    -Do not massage red bony areas    Next Steps:  -Teach patient strategies to minimize risks such as     -Consider consults to  interdisciplinary teams such as   Outcome: Progressing     Problem: Nutrition/Hydration-ADULT  Goal: Nutrient/Hydration intake appropriate for improving, restoring or maintaining nutritional needs  Description: Monitor and assess patient's nutrition/hydration status for malnutrition  Collaborate with interdisciplinary team and initiate plan and interventions as ordered  Monitor patient's weight and dietary intake as ordered or per policy  Utilize nutrition screening tool and intervene as necessary  Determine patient's food preferences and provide high-protein, high-caloric foods as appropriate       INTERVENTIONS:  - Monitor oral intake, urinary output, labs, and treatment plans  - Assess nutrition and hydration status and recommend course of action  - Evaluate amount of meals eaten  - Assist patient with eating if necessary   - Allow adequate time for meals  - Recommend/ encourage appropriate diets, oral nutritional supplements, and vitamin/mineral supplements  - Order, calculate, and assess calorie counts as needed  - Recommend, monitor, and adjust tube feedings and TPN/PPN based on assessed needs  - Assess need for intravenous fluids  - Provide specific nutrition/hydration education as appropriate  - Include patient/family/caregiver in decisions related to nutrition  Outcome: Progressing     Problem: MOBILITY - ADULT  Goal: Maintain or return to baseline ADL function  Description: INTERVENTIONS:  -  Assess patient's ability to carry out ADLs; assess patient's baseline for ADL function and identify physical deficits which impact ability to perform ADLs (bathing, care of mouth/teeth, toileting, grooming, dressing, etc )  - Assess/evaluate cause of self-care deficits   - Assess range of motion  - Assess patient's mobility; develop plan if impaired  - Assess patient's need for assistive devices and provide as appropriate  - Encourage maximum independence but intervene and supervise when necessary  - Involve family in performance of ADLs  - Assess for home care needs following discharge   - Consider OT consult to assist with ADL evaluation and planning for discharge  - Provide patient education as appropriate  Outcome: Progressing  Goal: Maintains/Returns to pre admission functional level  Description: INTERVENTIONS:  - Perform BMAT or MOVE assessment daily    - Set and communicate daily mobility goal to care team and patient/family/caregiver  - Collaborate with rehabilitation services on mobility goals if consulted  - Perform Range of Motion   times a day  - Reposition patient every   hours  - Dangle patient   times a day  - Stand patient   times a day  - Ambulate patient   times a day  - Out of bed to chair   times a day   - Out of bed for meals    times a day  - Out of bed for toileting  - Record patient progress and toleration of activity level   Outcome: Progressing

## 2021-06-28 NOTE — PROGRESS NOTES
Vancomycin Assessment    Paige Shepard is a 77 y o  female who is currently receiving vancomycin for skin-soft tissue infection  Relevant clinical data and objective history reviewed:  Creatinine   Date Value Ref Range Status   06/28/2021 1 83 (H) 0 60 - 1 30 mg/dL Final     Comment:     Standardized to IDMS reference method     /85 (BP Location: Left arm)   Pulse (!) 114   Temp 98 9 °F (37 2 °C) (Oral)   Resp 18   Ht 5' 3" (1 6 m)   Wt 68 5 kg (151 lb)   SpO2 96%   BMI 26 75 kg/m²   I/O last 3 completed shifts: In: 4164 [I V :1000; IV Piggyback:2450]  Out: 150 [Urine:150]  Lab Results   Component Value Date/Time    BUN 33 (H) 06/28/2021 02:08 AM    WBC 17 76 (H) 06/28/2021 01:21 AM    HGB 13 4 06/28/2021 01:21 AM    HCT 40 0 06/28/2021 01:21 AM    MCV 97 06/28/2021 01:21 AM     06/28/2021 01:21 AM     Temp Readings from Last 3 Encounters:   06/28/21 98 9 °F (37 2 °C) (Oral)     Vancomycin Days of Therapy: 1    Assessment/Plan  The patient is currently on vancomycin utilizing scheduled dosing based on adjusted body weight (due to obesity)  Baseline risks associated with therapy include: pre-existing renal impairment, concomitant nephrotoxic medications, advanced age, and dehydration  The patient is currently receiving vancomycin 1000 mg IV once  and after clinical evaluation will be changed to 1000 mg IV q 24 hrs  Pharmacy will also follow closely for s/sx of nephrotoxicity, infusion reactions, and appropriateness of therapy  BMP and CBC will be ordered per protocol  Plan for trough as patient approaches steady state, prior to the 4th  dose at approximately 0330 on 07/1/21  Due to infection severity, will target a trough of 15-20 (appropriate for most indications)   Pharmacy will continue to follow the patients culture results and clinical progress daily      Christiano Boykin

## 2021-06-28 NOTE — ASSESSMENT & PLAN NOTE
· Continue hydrocortisone b i d   · Received 10 mg IV Decadron in the emergency department during sepsis resuscitation  · Outpatient endocrinology follow-up

## 2021-06-29 PROBLEM — R26.2 AMBULATORY DYSFUNCTION: Status: ACTIVE | Noted: 2021-01-01

## 2021-06-29 NOTE — PROGRESS NOTES
Vancomycin Assessment    Darius Keane is a 77 y o  female who is currently receiving vancomycin 1000 mg IV q 24 hrs for skin-soft tissue infection     Relevant clinical data and objective history reviewed:  Creatinine   Date Value Ref Range Status   06/29/2021 1 43 (H) 0 60 - 1 30 mg/dL Final     Comment:     Standardized to IDMS reference method   06/28/2021 1 83 (H) 0 60 - 1 30 mg/dL Final     Comment:     Standardized to IDMS reference method     /89   Pulse (!) 108   Temp (!) 97 3 °F (36 3 °C)   Resp 18   Ht 5' 3" (1 6 m)   Wt 68 5 kg (151 lb)   SpO2 94%   BMI 26 75 kg/m²   I/O last 3 completed shifts: In: 5173 3 [P O :240; I V :2233 3; IV Piggyback:2700]  Out: 910 [Urine:910]  Lab Results   Component Value Date/Time    BUN 32 (H) 06/29/2021 05:22 AM    WBC 17 70 (H) 06/29/2021 05:22 AM    HGB 13 2 06/29/2021 05:22 AM    HCT 39 8 06/29/2021 05:22 AM     (H) 06/29/2021 05:22 AM     06/29/2021 05:22 AM     Temp Readings from Last 3 Encounters:   06/29/21 (!) 97 3 °F (36 3 °C)     Vancomycin Days of Therapy: 2    Assessment/Plan  The patient is currently on vancomycin utilizing scheduled dosing  Baseline risks associated with therapy include: pre-existing renal impairment, concomitant nephrotoxic medications, advanced age, and dehydration  The patient is receiving 1000 mg IV q 24 hrs based on a goal of 15-20 (appropriate for most indications) ; however, after clinical evaluation will be changed to 1250 mg IV q 24 hrs   Pharmacy will continue to follow closely for s/sx of nephrotoxicity, infusion reactions, and appropriateness of therapy  BMP and CBC will be ordered per protocol  Plan for trough as patient approaches steady state, prior to the 4th  dose at approximately 0430 on 7/1/21  Pharmacy will continue to follow the patients culture results and clinical progress daily      Kalyan Richter, Pharmacist

## 2021-06-29 NOTE — ASSESSMENT & PLAN NOTE
· POA from skilled nursing facility with abdominal pain/right flank pain and vomiting  · Lactic acid 5 1, WBC 17, JANNY, tachycardia  · Empiric antibiotic therapy with Zosyn, possible biliary obstruction, question cholecystitis, discontinue vancomycin  · Low suspicion for UTI  · Blood cultures pending  · Trend fever curve, leukocytosis, procalcitonin

## 2021-06-29 NOTE — ASSESSMENT & PLAN NOTE
· History left ductal carcinoma diagnosed 2007 with subsequent surgical resection, radiation, chemotherapy most recent dose 3 weeks ago  · Presented to receive cycle 7 of paclitaxel chemotherapy however prior to the infusion 6/4 patient and daughter complained of numerous issues including confusion, involuntary movements, hallucinations and weakness and this was canceled, postponed for approximately 2-3 weeks and received chemo 6/25  MRI brain at that time was stable  · Patient has known hepatic, lung and bony metastases  · repeat staging scans 5/26/2021 showed a decrease in size of multiple hepatic metastases and no evidence of new disease  · repeat tumor markers on 5/21 including a CA 15-3 continue to decline    · continue with denosumab every 4 weeks  · Continue outpatient follow-up at [facility] w/ Dr Jaime Sahni - may need transfer depending on LFT trend and overall work up

## 2021-06-29 NOTE — PLAN OF CARE
Problem: Potential for Falls  Goal: Patient will remain free of falls  Description: INTERVENTIONS:  - Educate patient/family on patient safety including physical limitations  - Instruct patient to call for assistance with activity   - Consult OT/PT to assist with strengthening/mobility   - Keep Call bell within reach  - Keep bed low and locked with side rails adjusted as appropriate  - Keep care items and personal belongings within reach  - Initiate and maintain comfort rounds  - Make Fall Risk Sign visible to staff  - Offer Toileting every 2 Hours, in advance of need  - Initiate/Maintain bed alarm  - Obtain necessary fall risk management equipment: walker  - Apply yellow socks and bracelet for high fall risk patients  - Consider moving patient to room near nurses station  Outcome: Progressing     Problem: PAIN - ADULT  Goal: Verbalizes/displays adequate comfort level or baseline comfort level  Description: Interventions:  - Encourage patient to monitor pain and request assistance  - Assess pain using appropriate pain scale  - Administer analgesics based on type and severity of pain and evaluate response  - Implement non-pharmacological measures as appropriate and evaluate response  - Consider cultural and social influences on pain and pain management  - Notify physician/advanced practitioner if interventions unsuccessful or patient reports new pain  Outcome: Progressing     Problem: INFECTION - ADULT  Goal: Absence or prevention of progression during hospitalization  Description: INTERVENTIONS:  - Assess and monitor for signs and symptoms of infection  - Monitor lab/diagnostic results  - Monitor all insertion sites, i e  indwelling lines, tubes, and drains  - Monitor endotracheal if appropriate and nasal secretions for changes in amount and color  - [place] appropriate cooling/warming therapies per order  - Administer medications as ordered  - Instruct and encourage patient and family to use good hand hygiene technique  - Identify and instruct in appropriate isolation precautions for identified infection/condition  Outcome: Progressing  Goal: Absence of fever/infection during neutropenic period  Description: INTERVENTIONS:  - Monitor WBC    Outcome: Progressing     Problem: SAFETY ADULT  Goal: Patient will remain free of falls  Description: INTERVENTIONS:  - Educate patient/family on patient safety including physical limitations  - Instruct patient to call for assistance with activity   - Consult OT/PT to assist with strengthening/mobility   - Keep Call bell within reach  - Keep bed low and locked with side rails adjusted as appropriate  - Keep care items and personal belongings within reach  - Initiate and maintain comfort rounds  - Make Fall Risk Sign visible to staff  - Offer Toileting every 2 Hours, in advance of need  - Initiate/Maintain bed alarm  - Obtain necessary fall risk management equipment: walker  - Apply yellow socks and bracelet for high fall risk patients  - Consider moving patient to room near nurses station  Outcome: Progressing  Goal: Maintain or return to baseline ADL function  Description: INTERVENTIONS:  -  Assess patient's ability to carry out ADLs; assess patient's baseline for ADL function and identify physical deficits which impact ability to perform ADLs (bathing, care of mouth/teeth, toileting, grooming, dressing, etc )  - Assess/evaluate cause of self-care deficits   - Assess range of motion  - Assess patient's mobility; develop plan if impaired  - Assess patient's need for assistive devices and provide as appropriate  - Encourage maximum independence but intervene and supervise when necessary  - Involve family in performance of ADLs  - Assess for home care needs following discharge   - Consider OT consult to assist with ADL evaluation and planning for discharge  - Provide patient education as appropriate  Outcome: Progressing  Goal: Maintains/Returns to pre admission functional level  Description: INTERVENTIONS:  - Perform BMAT or MOVE assessment daily    - Set and communicate daily mobility goal to care team and patient/family/caregiver  - Collaborate with rehabilitation services on mobility goals if consulted  - Out of bed for toileting  - Record patient progress and toleration of activity level   Outcome: Progressing     Problem: DISCHARGE PLANNING  Goal: Discharge to home or other facility with appropriate resources  Description: INTERVENTIONS:  - Identify barriers to discharge w/patient and caregiver  - Arrange for needed discharge resources and transportation as appropriate  - Identify discharge learning needs (meds, wound care, etc )  - Arrange for interpretive services to assist at discharge as needed  - Refer to Case Management Department for coordinating discharge planning if the patient needs post-hospital services based on physician/advanced practitioner order or complex needs related to functional status, cognitive ability, or social support system  Outcome: Progressing     Problem: Knowledge Deficit  Goal: Patient/family/caregiver demonstrates understanding of disease process, treatment plan, medications, and discharge instructions  Description: Complete learning assessment and assess knowledge base    Interventions:  - Provide teaching at level of understanding  - Provide teaching via preferred learning methods  Outcome: Progressing     Problem: GASTROINTESTINAL - ADULT  Goal: Minimal or absence of nausea and/or vomiting  Description: INTERVENTIONS:  - Administer IV fluids if ordered to ensure adequate hydration  - Maintain NPO status until nausea and vomiting are resolved  - Administer ordered antiemetic medications as needed  - Provide nonpharmacologic comfort measures as appropriate  - Advance diet as tolerated, if ordered  - Consider nutrition services referral to assist patient with adequate nutrition and appropriate food choices  Outcome: Progressing     Problem: GENITOURINARY - ADULT  Goal: Maintains or returns to baseline urinary function  Description: INTERVENTIONS:  - Assess urinary function  - Encourage oral fluids to ensure adequate hydration if ordered  - Administer IV fluids as ordered to ensure adequate hydration  - Administer ordered medications as needed  - Offer frequent toileting  - Follow urinary retention protocol if ordered  Outcome: Progressing     Problem: SKIN/TISSUE INTEGRITY - ADULT  Goal: Skin Integrity remains intact(Skin Breakdown Prevention)  Description: Assess:  -Perform Danny assessment every shift  -Clean and moisturize skin every shift  -Inspect skin when repositioning, toileting, and assisting with ADLS  -Assess extremities for adequate circulation and sensation     Bed Management:  -Have minimal linens on bed & keep smooth, unwrinkled  -Change linens as needed when moist or perspiring  -Avoid sitting or lying in one position for more than 2 hours while in bed  -Keep HOB at 45 degrees     Toileting:  -Offer bedside commode  -Assess for incontinence every 2 hours  -Use incontinent care products after each incontinent episode such as pads    Activity:  -Encourage activity and walks on unit  -Encourage or provide ROM exercises   -Turn and reposition patient every 2 Hours  -Use appropriate equipment to lift or move patient in bed    Skin Care:  -Avoid use of baby powder, tape, friction and shearing, hot water or constrictive clothing  -Relieve pressure over bony prominences using wedges  -Do not massage red bony areas    Outcome: Progressing     Problem: Nutrition/Hydration-ADULT  Goal: Nutrient/Hydration intake appropriate for improving, restoring or maintaining nutritional needs  Description: Monitor and assess patient's nutrition/hydration status for malnutrition  Collaborate with interdisciplinary team and initiate plan and interventions as ordered  Monitor patient's weight and dietary intake as ordered or per policy   Utilize nutrition screening tool and intervene as necessary  Determine patient's food preferences and provide high-protein, high-caloric foods as appropriate  INTERVENTIONS:  - Monitor oral intake, urinary output, labs, and treatment plans  - Assess nutrition and hydration status and recommend course of action  - Evaluate amount of meals eaten  - Assist patient with eating if necessary   - Allow adequate time for meals  - Recommend/ encourage appropriate diets, oral nutritional supplements, and vitamin/mineral supplements  - Order, calculate, and assess calorie counts as needed  - Recommend, monitor, and adjust tube feedings and TPN/PPN based on assessed needs  - Assess need for intravenous fluids  - Provide specific nutrition/hydration education as appropriate  - Include patient/family/caregiver in decisions related to nutrition  Outcome: Progressing     Problem: MOBILITY - ADULT  Goal: Maintain or return to baseline ADL function  Description: INTERVENTIONS:  -  Assess patient's ability to carry out ADLs; assess patient's baseline for ADL function and identify physical deficits which impact ability to perform ADLs (bathing, care of mouth/teeth, toileting, grooming, dressing, etc )  - Assess/evaluate cause of self-care deficits   - Assess range of motion  - Assess patient's mobility; develop plan if impaired  - Assess patient's need for assistive devices and provide as appropriate  - Encourage maximum independence but intervene and supervise when necessary  - Involve family in performance of ADLs  - Assess for home care needs following discharge   - Consider OT consult to assist with ADL evaluation and planning for discharge  - Provide patient education as appropriate  Outcome: Progressing  Goal: Maintains/Returns to pre admission functional level  Description: INTERVENTIONS:  - Perform BMAT or MOVE assessment daily    - Set and communicate daily mobility goal to care team and patient/family/caregiver     - Collaborate with rehabilitation services on mobility goals if consulted  - Out of bed for toileting  - Record patient progress and toleration of activity level   Outcome: Progressing

## 2021-06-29 NOTE — ASSESSMENT & PLAN NOTE
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

## 2021-06-29 NOTE — CONSULTS
Vancomycin therapy discontinued/completed per physician  Thank you for the consult  Pharmacy will sign off now

## 2021-06-29 NOTE — ASSESSMENT & PLAN NOTE
· Creatinine 1 83 on admission with most recent creatinine 0 53 on 06/06/2021  · Continue fluid resuscitation most likely pre renal with vomiting and abdominal pain  · Renally dose medications  · Trend creatinine - improving

## 2021-06-29 NOTE — UTILIZATION REVIEW
Initial Clinical Review    Admission: Date/Time/Statement:   Admission Orders (From admission, onward)     Ordered        06/28/21 0550  INPATIENT ADMISSION  Once                   Orders Placed This Encounter   Procedures    INPATIENT ADMISSION     Standing Status:   Standing     Number of Occurrences:   1     Order Specific Question:   Level of Care     Answer:   Med Surg [16]     Order Specific Question:   Estimated length of stay     Answer:   More than 2 Midnights     Order Specific Question:   Certification     Answer:   I certify that inpatient services are medically necessary for this patient for a duration of greater than two midnights  See H&P and MD Progress Notes for additional information about the patient's course of treatment  ED Arrival Information     Expected Arrival Acuity    - 6/28/2021 01:15 Urgent         Means of arrival Escorted by Service Admission type    [place] [facility] [address] [place], [place] [address]ist Urgent         Arrival complaint    vomiting        Chief Complaint   Patient presents with    Vomiting     nausea and vomiting for 2-3 days, zofran not helping  from [facility] rehab   last chemo tx 3 weeks ago  Initial Presentation:77year old female to the ED from nursing rehab via EMS with complaints of nausea, vomiting for 3 days  Admitted to inpatient for sepsis with acute organ dysfunction, elevated transaminase, butch  H/O left ductal carcinoma with subsequent surgical resection, radiation, chemotherapy most recent dose 3 weeks ago  Has hepatic, lung, bony mets  Arrives with intermittent gagging, wretching, abdominal tenderness  Has elevated WBCs, lactic acid  Creat 1 83, alk phos 565, total bili 2 77  Given IV fluids  CT abdomen and pelvis with questionable gallstones, ileus/enteritis  Check US  Trend WBCs, fever, procal   IV abx  Started in the ED  Blood and urine cultures pending  CRea 1 83 on admit with recent on 6/6/21 : 0 53  Continue with IV fluids    Likely elevated due to vomiting, abdominal pain  Tachycardic  6/28 Gen/surg consult:  Elevated liver enzyme likely due to liver mets  No surgical intervention at this time  COntinue with NPO, IV fluids  Date: 6/29    Day 2:  Blood cultures pending  Continue with IV fluids, IV zosyn  She is pale, has vocal cord dysfunction     Gen/surg consult: elevated total bili, wbc, procal     ED Triage Vitals [06/28/21 0119]   Temperature Pulse Respirations Blood Pressure SpO2   97 5 °F (36 4 °C) (!) 119 22 133/88 96 %      Temp Source Heart Rate Source Patient Position - Orthostatic VS BP Location FiO2 (%)   Temporal Monitor Lying Right arm --      Pain Score       Worst Possible Pain          Wt Readings from Last 1 Encounters:   06/28/21 68 5 kg (151 lb)     Additional Vital Signs:   /Time  Temp Pulse  Resp  BP  MAP (mmHg)  SpO2  O2 Device Patient Position - Orthostatic VS   06/29/21 0815  -- --  --  --  --  --  None (Room air) --   06/29/21 07:56:22  97 3 °F (36 3 °C)Abnormal  108Abnormal   18  131/89  103  94 %  -- --   06/28/21 21:50:53  97 6 °F (36 4 °C) 111Abnormal   18  126/85  99  96 %  -- --   06/28/21 1600  -- --  --  --  --  --  None (Room air) --   06/28/21 13:59:07  97 4 °F (36 3 °C)Abnormal  112Abnormal   18  128/84  99  96 %  -- --   06/28/21 0750  -- --  --  --  --  --  None (Room air) --   06/28/21 07:49:52  98 9 °F (37 2 °C) 114Abnormal   18  125/85  98  96 %  -- Lying   06/28/21 0651  -- 103  20  135/80  --  96 %  None (Room air) Lying   06/28/21 0600  -- 102  20  135/80  100  95 %  None (Room air) Lying   06/28/21 0502  -- 109Abnormal   22  115/68  --  95 %  None (Room air) Lying   06/28/21 0408  -- 105  22  137/82  --  95 %  None (Room air) Lying   06/28/21 0300  -- 103  26Abnormal   141/82  105  95 %  None (Room air) Lying   06/28/21 0215  -- 107Abnormal   21  142/63  91  98 %  None (Room air) Lying   06/28/21 0130  -- 128Abnormal   22  133/88  106  97 %  None (Room air) Sitting   06/28/21 0119  97 5 °F (36 4 °C) 119Abnormal   22  133/88  --  96 %  None (Room air)      Pertinent Labs/Diagnostic Test Results:   6/29 MRCP: Diffusely enlarged liver, with numerous discrete nodules throughout the left lobe, as well as confluent abnormality throughout the entire right lobe likely confluent metastatic disease, with tumor extent best appreciated on series 4  There is no evidence of biliary obstruction  Numerous sclerotic bone metastases, better appreciated on CT    6/28 US LIver:   Heterogeneous liver with nodular areas  Lewayne Dung the history this most likely represents metastatic disease perhaps with developing pseudocirrhosis   Primary liver pathology would be less likely  There is ascites demonstrated  Gallbladder wall thickening without stone could be on the basis of liver disease   There is no sonographic Dela Cruz sign   Small gallbladder polyp is noted   If there is high suspicion for acute cholecystitis hepatobiliary study may be useful  6/28 CXR: No acute cardiopulmonary disease  6/28 CT A/P: Multiple hypodense hepatic nodules and sclerotic lesions in the axial skeleton consistent with metastases  Nonspecific thickening of small bowel loops in the left upper quadrant may represent a focal enteritis  Small volume ascites which is not described on the prior report  6/28 CT head:   No acute intracranial abnormality  Scattered areas of sclerosis in the calvarium again noted concerning for osseous metastasis  Recommend MRI of brain without and with contrast for further evaluation in the appropriate clinical setting  [place]        Results from last 7 days   Lab Units 06/29/21  0522 06/28/21  0121   WBC Thousand/uL 17 70* 17 76*   HEMOGLOBIN g/dL 13 2 13 4   HEMATOCRIT % 39 8 40 0   PLATELETS Thousands/uL 239 282   NEUTROS ABS Thousands/µL 14 44* 13 49*         Results from last 7 days   Lab Units 06/29/21  0522 06/28/21  0208   SODIUM mmol/L 139 134*   POTASSIUM mmol/L 4 7 4 9   CHLORIDE mmol/L 105 98*   CO2 mmol/L 15* 20*   ANION GAP mmol/L 19* 16*   BUN mg/dL 32* 33*   CREATININE mg/dL 1 43* 1 83*   EGFR ml/min/1 73sq m 38 28   CALCIUM mg/dL 7 0* 8 1*   MAGNESIUM mg/dL  --  2 4   PHOSPHORUS mg/dL  --  3 2     Results from last 7 days   Lab Units 06/29/21  0522 06/28/21  0208   AST U/L 356* 329*   ALT U/L 192* 178*   ALK PHOS U/L 498* 565*   TOTAL PROTEIN g/dL 5 4* 5 6*   ALBUMIN g/dL 2 2* 2 4*   TOTAL BILIRUBIN mg/dL 3 94* 2 77*         Results from last 7 days   Lab Units 06/29/21  0522 06/28/21  0208   GLUCOSE RANDOM mg/dL 71 139         Results from last 7 days   Lab Units 06/28/21  0208   PROTIME seconds 15 1*   INR  1 21*   PTT seconds 27     Results from last 7 days   Lab Units 06/28/21  0208   TSH 3RD GENERATON uIU/mL 0 619     Results from last 7 days   Lab Units 06/28/21  1019   PROCALCITONIN ng/ml 1 46*     Results from last 7 days   Lab Units 06/28/21  0316 06/28/21  0121   LACTIC ACID mmol/L 2 0 5 1*         Results from last 7 days   Lab Units 06/28/21  0247   CLARITY UA  Clear   COLOR UA  [place]   SPEC GRAV UA  1 025   PH UA  5 5   GLUCOSE UA mg/dl 100 (1/10%)*   KETONES UA mg/dl 15 (1+)*   BLOOD UA  Trace-lysed*   PROTEIN UA mg/dl 100 (2+)*   NITRITE UA  Negative   BILIRUBIN UA  Moderate*   UROBILINOGEN UA E U /dl >=8 0*   LEUKOCYTES UA  Trace*   WBC UA /hpf 0-5   RBC UA /hpf 4-10*   BACTERIA UA /hpf Innumerable*   EPITHELIAL CELLS WET PREP /hpf Occasional     Results from last 7 days   Lab Units 06/28/21  0247 06/28/21  0239 06/28/21  0224   BLOOD CULTURE   --  Received in Microbiology Lab  Culture in Progress  Received in Microbiology Lab  Culture in Progress     URINE CULTURE  60,000-69,000 cfu/ml Gram Negative Dickson Enteric Like*  --   --      ED Treatment:   Medication Administration from 06/28/2021 0115 to 06/28/2021 0744       Date/Time Order Dose Route Action     06/28/2021 0124 sodium chloride 0 9 % infusion 125 mL/hr Intravenous New Bag     06/28/2021 0135 ondansetron (ZOFRAN) 8 mg in sodium chloride 0 9 % 50 mL IVPB 8 mg Intravenous New Bag     06/28/2021 0124 diphenhydrAMINE (BENADRYL) injection 50 mg 50 mg Intravenous Given     06/28/2021 0200 pantoprazole (PROTONIX) 80 mg in sodium chloride 0 9 % 100 mL IVPB 80 mg Intravenous New Bag     06/28/2021 0234 metoclopramide (REGLAN) injection 10 mg 10 mg Intravenous Given     06/28/2021 0234 dexamethasone (PF) (DECADRON) injection 10 mg 10 mg Intravenous Given     06/28/2021 0222 sodium chloride 0 9 % bolus 2,000 mL 2,000 mL Intravenous New Bag     06/28/2021 0523 piperacillin-tazobactam (ZOSYN) 3 375 g in sodium chloride 0 9 % 100 mL IVPB 3 375 g Intravenous New Bag     06/28/2021 0406 vancomycin (VANCOCIN) IVPB (premix in dextrose) 1,000 mg 200 mL 1,000 mg Intravenous New Bag        Past Medical History:   Diagnosis Date    Acquired absence of bilateral breasts and nipples     Anxiety disorder     Breast implant status     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     Cortical blindness of right side of brain     Disorder of pituitary gland (HCC)     Dysarthria and anarthria     Insomnia, unspecified     Interstitial lung disease ([facility] )     Malignant neoplasm of left breast (HCC)     Polyneuropathy     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)     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)     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[facility] )     Secondary malignant neoplasm of other specified sites ([facility] )     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     Visual hallucination      Admitting Diagnosis: Gastritis [K29 70]  Vomiting [R11 10]  Nausea & vomiting [R11 2]  Elevated LFTs [R79 89]  JANNY (acute kidney injury) ([facility] ) [N17 9]  Sepsis ([facility] ) [A41 9]  Age/Sex: 77 y o  female  Admission Orders:  SCDS   MRSA  NPO  I/O  Scheduled Medications:  aspirin, 81 mg, Oral, Daily  FLUoxetine, 20 mg, Oral, Daily  heparin (porcine), 5,000 Units, Subcutaneous, Q8H [address]  hydrocortisone, 10 mg, Oral, Daily  hydrocortisone, 5 mg, Oral, Daily  levothyroxine, 50 mcg, Oral, Daily  melatonin, 6 mg, Oral, HS  pantoprazole, 40 mg, Intravenous, Q24H [address]  piperacillin-tazobactam, 2 25 g, Intravenous, Q6H  [START ON 6/30/2021] vancomycin, 20 mg/kg (Adjusted), Intravenous, Q24H      Continuous IV Infusions:  sodium chloride, 125 mL/hr, Intravenous, Continuous      PRN Meds:  acetaminophen, 650 mg, Oral, Q6H PRN  diphenhydrAMINE, 25 mg, Oral, Q6H PRN  ondansetron, 4 mg, Intravenous, Q8H PRN  simethicone, 40 mg, Oral, Q6H PRN        IP CONSULT TO CASE MANAGEMENT  IP CONSULT TO PHARMACY  IP CONSULT TO ACUTE CARE SURGERY    Network Utilization Review Department  ATTENTION: Please call with any questions or concerns to [phone] and carefully listen to the prompts so that you are directed to the right person  All voicemails are confidential   [facility] all requests for admission clinical reviews, approved or denied determinations and any other requests to dedicated fax number below belonging to the campus where the patient is receiving treatment   List of dedicated fax numbers for the Facilities:  [address] DENIALS (Administrative/Medical Necessity) [phone]   [address] (Maternity/NICU/Pediatrics) [phone]   [address]  [address] [address] [place] [address] [address] [facility] [place] [address] [phone]

## 2021-06-29 NOTE — ASSESSMENT & PLAN NOTE
· Currently physical rehab at skilled nursing facility with full intention of continuing oncological treatment  · Her daughter reported episodes of confusion, involuntary movements, hallucinations, and weakness  She also had a fall in the bathroom and recent trauma to the back of her head  Some of the neurological changes started after the fall  · The etiology of her symptoms could be related to the recent move, vision loss, and toxicity from chemotherapy

## 2021-06-29 NOTE — PROGRESS NOTES
Progress Note - General Surgery   Orie Dancer 77 y o  female MRN: [id number]  Unit/Bed#: -01 Encounter: [id number]    Assessment:  61-year-old female with metastatic breast cancer presented to the hospital complaining of right upper quadrant abdominal pain  The patient states she feels a bit better  Total bilirubin continues to increase    Plan:  Discussed with the hospitalist service  In the setting of continue elevation of total bilirubin and evidence of infection with elevated white blood cell count and procalcitonin recommend ruling out biliary obstruction with MRCP  Per report, the patient did receive a chemotherapy treatment 4 days ago, lab abnormalities may be related to this however it has only been a short time since her treatment  Will continue to monitor  Subjective/Objective     Subjective: The patient states she feels a bit better today  Her pain is improved and she denies any nausea or vomiting  She states she moved her bowels yesterday  Objective:     Blood pressure 131/89, pulse (!) 108, temperature (!) 97 3 °F (36 3 °C), resp  rate 18, height 5' 3" (1 6 m), weight 68 5 kg (151 lb), SpO2 94 %  ,Body mass index is 26 75 kg/m²        Intake/Output Summary (Last 24 hours) at 6/29/2021 1217  Last data filed at 6/29/2021 0518  Gross per 24 hour   Intake 1723 33 ml   Output --   Net 1723 33 ml       Invasive Devices     Peripheral Intravenous Line            Peripheral IV 06/28/21 Right Wrist 1 day    Peripheral IV 06/28/21 Right Wrist 1 day                Physical Exam: General appearance: alert and oriented, in no acute distress  Head: Normocephalic, without obvious abnormality, atraumatic  Abdomen: Soft, mild right upper quadrant tenderness without rebound or guarding  Skin: Skin color, texture, turgor normal  No rashes or lesions  Neurologic: Grossly normal    Lab, Imaging and other studies:  CBC:   Lab Results   Component Value Date    WBC 17 70 (H) 06/29/2021    HGB 13 2 06/29/2021 HCT 39 8 06/29/2021     (H) 06/29/2021     06/29/2021    MCH 33 2 06/29/2021    MCHC 33 2 06/29/2021    RDW 19 4 (H) 06/29/2021    MPV 10 0 06/29/2021    NRBC 1 06/29/2021   , CMP:   Lab Results   Component Value Date    SODIUM 139 06/29/2021    K 4 7 06/29/2021     06/29/2021    CO2 15 (L) 06/29/2021    BUN 32 (H) 06/29/2021    CREATININE 1 43 (H) 06/29/2021    CALCIUM 7 0 (L) 06/29/2021     (H) 06/29/2021     (H) 06/29/2021    ALKPHOS 498 (H) 06/29/2021    EGFR 38 06/29/2021   , Lipase: No results found for: LIPASE  VTE Pharmacologic Prophylaxis: Heparin  VTE Mechanical Prophylaxis: sequential compression device

## 2021-06-29 NOTE — CASE MANAGEMENT
LOS: 1 day 17 GREEN, pt is not a 30 day readmission or a bundle pt, pt was admitted for sepsis with organ dysfx, pt was living in [facility] with the daughter;s step Dad and she stated he was not caring for her, she then had her mother move in with her, pt was at [facility] for rehab and Swetha Kim does not want her to return, pt gets infusion treatments at [facility], the daughter would like for the pt to get her infusions here if possible , pt's next tx is 7/9/2021 , pt get 3 weeks of treatments and then a week off, pt lives with her daughter and she is her care giver, pt lives in a 3 story home with also a basement, 2 steps outside, 12-14 steps inside to  each level, pt's bedroom is on the 2nd floor, br 1st & 2nd floor, pt has no hx of HHC, pt has not received and treatments for the past 4 weeks while she was in rehab as per the daughter,  DME: walker, w/c shower chair, RX plan [facility] [place], pt does not smoke or drink alcohol , pt remains on IV antibiotics, pt has elevated liver enzymes, pt is not stable for d/c as discussed at care coordination rounds, cm will continue to follow and work on a safe d /c plan , pt/ot eval is needed  CM reviewed d/c planning process including the following: identifying help at home, patient preference for d/c planning needs, availability of treatment team to discuss questions or concerns patient and/or family may have regarding understanding medications and recognizing signs and symptoms once discharged  CM also encouraged patient to follow up with all recommended appointments after discharge  Patient advised of importance for patient and family to participate in managing patients medical well being

## 2021-06-29 NOTE — PROGRESS NOTES
[address]  Progress Note - Elen Cueto 1955, 77 y o  female MRN: [id number]  Unit/Bed#: -01 Encounter: [id number]  Primary Care Provider: Vanita Yu MD   Date and time admitted to hospital: 6/28/2021  1:15 AM    * Sepsis with acute organ dysfunction [facility])  Assessment & Plan  · POA from skilled nursing facility with abdominal pain/right flank pain and vomiting  · Lactic acid 5 1, WBC 17, JANNY, tachycardia  · Empiric antibiotic therapy with Zosyn, possible biliary obstruction, question cholecystitis, discontinue vancomycin  · Low suspicion for UTI  · Blood cultures pending  · Trend fever curve, leukocytosis, procalcitonin    Elevation of levels of liver transaminase levels  Assessment & Plan  · Patient presented with abdominal pain/flank pain and vomiting  · Found to have elevated liver enzymes, review of records there was no elevation at last hospitalization  · Patient presented with transaminitis and elevated alk-phos, today , , alk-phos 498  · Lab work from 06/06/2021 showed ALT 75, AST 64, alk-phos 93  · Recent image in is show stable, if not improved liver Mets  · Appreciate input from General surgery  · CT abdomen pelvis showed multiple hypodense hepatic nodules, right upper quadrant ultrasound showed gallbladder wall thickening without stone, small gallbladder polyp  · Will check MRCP rule out possible biliary obstruction from hepatic mass  Can also consider a CT abdomen pelvis with however with contrast, will hold on this given JANNY  · Continue IV fluids, trend LFTs, may require transfer to \[facility]\"" for further workup  · Continue IV Zosyn empirically for possible cholangitis  She is afebrile however has leukocytosis, with normal WBC earlier this month    · Advance diet to clears    Metastatic breast cancer [facility])  Assessment & Plan  · History left ductal carcinoma diagnosed 2007 with subsequent surgical resection, radiation, chemotherapy most recent dose 3 weeks ago  · Presented to receive cycle 7 of paclitaxel chemotherapy however prior to the infusion 6/4 patient and daughter complained of numerous issues including confusion, involuntary movements, hallucinations and weakness and this was canceled, postponed for approximately 2-3 weeks and received chemo 6/25  MRI brain at that time was stable  · Patient has known hepatic, lung and bony metastases  · repeat staging scans 5/26/2021 showed a decrease in size of multiple hepatic metastases and no evidence of new disease  · repeat tumor markers on 5/21 including a CA 15-3 continue to decline  · continue with denosumab every 4 weeks  · Continue outpatient follow-up at [facility] w/ Dr Raul Cerrato - may need transfer depending on LFT trend and overall work up     Visual hallucinations  Assessment & Plan  · Patient recently residing with her daughter in [place], experienced visual hallucinations 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 and underwent 5 fractions of radiation to the base of the skull and sella, last dose 11/24/2020  · Unfortunately patient has not had recovery in vision  · Evaluated recently by her outpatient team at [facility] due to confusion, disorientation after moving to a nursing home locally  Likely due to ongoing visual impairments, unfamiliar environments and disturbed sleep/wake cycle in the setting of progressive malignancy  · Repeat MRI this month at [facility] stable findings on brain  · Supportive care    Ambulatory dysfunction  Assessment & Plan  · Currently physical rehab at skilled nursing facility with full intention of continuing oncological treatment  · Her daughter reported episodes of confusion, involuntary movements, hallucinations, and weakness  She also had a fall in the bathroom and recent trauma to the back of her head  Some of the neurological changes started after the fall     · The etiology of her symptoms could be related to the recent move, vision loss, and toxicity from chemotherapy  JANNY (acute kidney injury) ([facility] [address] )  Assessment & Plan  · Creatinine 1 83 on admission with most recent creatinine 0 53 on 2021  · Continue fluid resuscitation most likely pre renal with vomiting and abdominal pain  · Renally dose medications  · Trend creatinine - improving     Pituitary dysfunction (HCC)  Assessment & Plan  · Continue hydrocortisone b i d   · Received 10 mg IV Decadron in the emergency department during sepsis resuscitation  · Outpatient endocrinology follow-up    Hypothyroidism  Assessment & Plan  · Continue levothyroxine    VTE Pharmacologic Prophylaxis:   Pharmacologic: Heparin  Mechanical VTE Prophylaxis in Place: Yes    Patient Centered Rounds: I have performed bedside rounds with nursing staff today  Discussions with Specialists or Other Care Team Provider: nursing, cm,     Education and Discussions with Family / Patient: patient, dtr at bedside     Time Spent for Care: 30 minutes  More than 50% of total time spent on counseling and coordination of care as described above  Current Length of Stay: 1 day(s)    Current Patient Status: Inpatient   Certification Statement: The patient will continue to require additional inpatient hospital stay due to pending ongoing arjun for abnl LFTS    Discharge Plan: may need transfer to [facility], pending    Code Status: Level 1 - Full Code    Subjective:   Patient seen examined at bedside  States she feels dry but has no more abdominal pain and would like to try to eat or drink something  Objective:     Vitals:   Temp (24hrs), Av 5 °F (36 4 °C), Min:97 3 °F (36 3 °C), Max:97 6 °F (36 4 °C)    Temp:  [97 3 °F (36 3 °C)-97 6 °F (36 4 °C)] 97 3 °F (36 3 °C)  HR:  [108-111] 108  Resp:  [18] 18  BP: (126-131)/(85-89) 131/89  SpO2:  [94 %-96 %] 94 %  Body mass index is 26 75 kg/m²  Input and Output Summary (last 24 hours):        Intake/Output Summary (Last 24 hours) at 2021 1422  Last data filed at 2021 1403  Gross per 24 hour   Intake 1221 25 ml   Output --   Net 1221 25 ml       Physical Exam:     Physical Exam  Constitutional:       Appearance: Normal appearance  HENT:      Head: Atraumatic  Comments: alopecia     Mouth/Throat:      Mouth: Mucous membranes are dry  Comments: Dry MM  Vocal cord dysfunction  Eyes:      Comments: Blindness    Cardiovascular:      Rate and Rhythm: Normal rate and regular rhythm  Pulmonary:      Effort: Pulmonary effort is normal       Breath sounds: Normal breath sounds  Abdominal:      General: Abdomen is flat  Palpations: Abdomen is soft  Musculoskeletal:      Cervical back: Normal range of motion and neck supple  Skin:     General: Skin is warm and dry  Coloration: Skin is pale  Neurological:      General: No focal deficit present  Mental Status: She is alert  Psychiatric:         Mood and Affect: Mood normal          Behavior: Behavior normal        Additional Data:     Labs:    Results from last 7 days   Lab Units 06/29/21  0522   WBC Thousand/uL 17 70*   HEMOGLOBIN g/dL 13 2   HEMATOCRIT % 39 8   PLATELETS Thousands/uL 239   NEUTROS PCT % 82*   LYMPHS PCT % 9*   MONOS PCT % 7   EOS PCT % 0     Results from last 7 days   Lab Units 06/29/21  0522   SODIUM mmol/L 139   POTASSIUM mmol/L 4 7   CHLORIDE mmol/L 105   CO2 mmol/L 15*   BUN mg/dL 32*   CREATININE mg/dL 1 43*   ANION GAP mmol/L 19*   CALCIUM mg/dL 7 0*   ALBUMIN g/dL 2 2*   TOTAL BILIRUBIN mg/dL 3 94*   ALK PHOS U/L 498*   ALT U/L 192*   AST U/L 356*   GLUCOSE RANDOM mg/dL 71     Results from last 7 days   Lab Units 06/28/21  0208   INR  1 21*             Results from last 7 days   Lab Units 06/29/21  0522 06/28/21  1019 06/28/21  0316 06/28/21  0121   LACTIC ACID mmol/L  --   --  2 0 5 1*   PROCALCITONIN ng/ml 2 03* 1 46*  --   --            * I Have Reviewed All Lab Data Listed Above  * Additional Pertinent Lab Tests Reviewed:  S[facility] [address] Admission Reviewed    Imaging:    Imaging Reports Reviewed Today Include: all  Imaging Personally Reviewed by Myself Includes:  none    Recent Cultures (last 7 days):     Results from last 7 days   Lab Units 06/28/21  0247 06/28/21  0239 06/28/21  0224   BLOOD CULTURE   --  No Growth at 24 hrs  No Growth at 24 hrs  URINE CULTURE  60,000-69,000 cfu/ml Klebsiella pneumoniae*  --   --        Last 24 Hours Medication List:   Current Facility-Administered Medications   Medication Dose Route Frequency Provider Last Rate    acetaminophen  650 mg Oral Q6H PRN [place] Pean Ca Pandya MD      aspirin  81 mg Oral Daily Allison S Ramana, CRNP      diazepam  5 mg Intravenous Once PRN Guy Stauffer PA-C      diphenhydrAMINE  25 mg Oral Q6H PRN Allison S Ramana, CRNP      FLUoxetine  20 mg Oral Daily Allison S Ramana, CRNP      heparin (porcine)  5,000 Units Subcutaneous Q8H [facility] Allison S Ramana, CRNP      hydrocortisone  10 mg Oral Daily Allison S Ramana, CRNP      hydrocortisone  5 mg Oral Daily Allison S Ramana, CRNP      levothyroxine  50 mcg Oral Daily Allison S Ramana, CRNP      melatonin  6 mg Oral HS Allison S Ramana, CRNP      ondansetron  4 mg Intravenous Q8H PRN Allison S Ramana, CRNP      pantoprazole  40 mg Intravenous Q24H [facility] Allison S Ramana, CRNP      piperacillin-tazobactam  2 25 g Intravenous Q6H Allison S Ramana, CRNP 2 25 g (06/29/21 1403)    simethicone  40 mg Oral Q6H PRN Shanthi Alegre MD      sodium chloride  125 mL/hr Intravenous Continuous Taco Cull,  mL/hr (06/29/21 3975)        Today, Patient Was Seen By: Aiyana Laureano PA-C    ** Please Note: Dictation voice to text software may have been used in the creation of this document   **

## 2021-06-29 NOTE — ASSESSMENT & PLAN NOTE
· Patient presented with abdominal pain/flank pain and vomiting  · Found to have elevated liver enzymes, review of records there was no elevation at last hospitalization  · Patient presented with transaminitis and elevated alk-phos, today , , alk-phos 498  · Lab work from 06/06/2021 showed ALT 75, AST 64, alk-phos 93  · Recent image in is show stable, if not improved liver Mets  · Appreciate input from General surgery  · CT abdomen pelvis showed multiple hypodense hepatic nodules, right upper quadrant ultrasound showed gallbladder wall thickening without stone, small gallbladder polyp  · Will check MRCP rule out possible biliary obstruction from hepatic mass  Can also consider a CT abdomen pelvis with however with contrast, will hold on this given JANNY  · Continue IV fluids, trend LFTs, may require transfer to [facility] for further workup  · Continue IV Zosyn empirically for possible cholangitis  She is afebrile however has leukocytosis, with normal WBC earlier this month    · Advance diet to clears

## 2021-06-30 PROBLEM — Z71.89 GOALS OF CARE, COUNSELING/DISCUSSION: Status: ACTIVE | Noted: 2021-01-01

## 2021-06-30 PROBLEM — J96.01 ACUTE RESPIRATORY FAILURE WITH HYPOXIA (HCC): Status: ACTIVE | Noted: 2021-01-01

## 2021-06-30 PROBLEM — J69.0 ASPIRATION PNEUMONIA (HCC): Status: ACTIVE | Noted: 2021-01-01

## 2021-06-30 NOTE — PROGRESS NOTES
Progress Note - General Surgery   Raul Conn 77 y o  female MRN: [id number]  Unit/Bed#: -01 Encounter: [id number]    Assessment:  Abdominal pain slowly resolving  Metastatic breast cancer with Mets to the liver lung and bone  MRCP reveals no intrahepatic biliary ductal dilatation however the intrahepatic ducts appeared to be compressed by numerous metastasis  The common bile duct is normal caliber at 6 mm  Plan:  No surgical intervention at this time  Will encourage liquids p o  As tolerated  Out of bed if possible  Subjective/Objective   Chief Complaint:  Abdominal pain    Subjective:  Patient admits some abdominal pain with states she is extremely thirsty  She denies any vomiting  Objective:     Blood pressure 113/73, pulse 96, temperature 97 6 °F (36 4 °C), resp  rate 22, height 5' 3" (1 6 m), weight 68 5 kg (151 lb), SpO2 (!) 88 %  ,Body mass index is 26 75 kg/m²  Intake/Output Summary (Last 24 hours) at 6/30/2021 0800  Last data filed at 6/30/2021 5865  Gross per 24 hour   Intake 1457 5 ml   Output --   Net 1457 5 ml       Invasive Devices     Peripheral Intravenous Line            Peripheral IV 06/29/21 Right Hand <1 day                Physical Exam:  The patient is awake there is and does not appear to be in distress  Abdomen is soft with tenderness in the upper mid and upper right abdomen  No masses rigidity or guarding is noted      Lab, Imaging and other studies:CBC: No results found for: WBC, HGB, HCT, MCV, PLT, ADJUSTEDWBC, MCH, MCHC, RDW, MPV, NRBC, CMP: No results found for: SODIUM, K, CL, CO2, ANIONGAP, BUN, CREATININE, GLUCOSE, CALCIUM, AST, ALT, ALKPHOS, PROT, BILITOT, EGFR

## 2021-06-30 NOTE — ASSESSMENT & PLAN NOTE
Patient with increasing O2 needs with crackles and rales, L>R    Chest x-ray with significant left pneumonia  Cefepime, Vanco  Aspiration precautions  Speech eval  IS  RT

## 2021-06-30 NOTE — PHYSICAL THERAPY NOTE
PHYSICAL THERAPY NOTE          Patient Name: Rajendra Hoang  AZWILLIEN'Z Date: 6/30/2021 06/30/21 1524   Note Type   Note type Evaluation   Cancel Reasons Medical status     Received order for PT consult  Chart reviewed  Pt admitted with diagnosis sepsis with acute organ dysfunction  Pt with metastatic breast CA with mets to liver, lung, and bone  Per medical team, patient with declining medical status at this time and is not appropriate for PT services  Will continue to follow and see patient as medically appropriate at a later time       Maria Dolores Mensah, PT,DPT

## 2021-06-30 NOTE — ASSESSMENT & PLAN NOTE
· POA from skilled nursing facility with abdominal pain/right flank pain and vomiting  · Lactic acid 5 1, WBC 17, JANNY, tachycardia  · Started empiric Zosyn for possible cholangitis, MRCP does not suggest biliary tract infection  · Developed aspiration pneumonia 6/30    Antibiotics switched to cefepime and Vanco   · UA reviewed, denies symptoms  · Blood cultures negative to date  · Trend fever curve, leukocytosis, procalcitonin

## 2021-06-30 NOTE — PLAN OF CARE
Problem: PAIN - ADULT  Goal: Verbalizes/displays adequate comfort level or baseline comfort level  Description: Interventions:  - Encourage patient to monitor pain and request assistance  - Assess pain using appropriate pain scale  - Administer analgesics based on type and severity of pain and evaluate response  - Implement non-pharmacological measures as appropriate and evaluate response  - Consider cultural and social influences on pain and pain management  - Notify physician/advanced practitioner if interventions unsuccessful or patient reports new pain  Outcome: Progressing     Problem: GASTROINTESTINAL - ADULT  Goal: Minimal or absence of nausea and/or vomiting  Description: INTERVENTIONS:  - Administer IV fluids if ordered to ensure adequate hydration  - Maintain NPO status until nausea and vomiting are resolved  - Administer ordered antiemetic medications as needed  - Provide nonpharmacologic comfort measures as appropriate  - Advance diet as tolerated, if ordered  - Consider nutrition services referral to assist patient with adequate nutrition and appropriate food choices  Outcome: Not Progressing

## 2021-06-30 NOTE — PROGRESS NOTES
Vancomycin Assessment    Gabe Rucker is a 77 y o  female who is currently prescribed vancomycin 1000 mg IV q 12 hrs for Pneumonia     Relevant clinical data and objective history reviewed:  Creatinine   Date Value Ref Range Status   06/30/2021 2 37 (H) 0 60 - 1 30 mg/dL Final     Comment:     Specimen Icteric; Results May be Affected   06/29/2021 1 43 (H) 0 60 - 1 30 mg/dL Final     Comment:     Standardized to IDMS reference method   06/28/2021 1 83 (H) 0 60 - 1 30 mg/dL Final     Comment:     Standardized to IDMS reference method     /65   Pulse 81   Temp 97 6 °F (36 4 °C)   Resp 22   Ht 5' 3" (1 6 m)   Wt 68 5 kg (151 lb)   SpO2 98%   BMI 26 75 kg/m²   I/O last 3 completed shifts: In: 2338 8 [P O :420; I V :1618 8; IV Piggyback:300]  Out: -   Lab Results   Component Value Date/Time    BUN 37 (H) 06/30/2021 11:29 AM    WBC 22 38 (H) 06/30/2021 11:29 AM    HGB 12 2 06/30/2021 11:29 AM    HCT 37 7 06/30/2021 11:29 AM     (H) 06/30/2021 11:29 AM     06/30/2021 11:29 AM     Temp Readings from Last 3 Encounters:   06/29/21 97 6 °F (36 4 °C)     Vancomycin Days of Therapy: 1    Assessment/Plan  The patient is currently on vancomycin utilizing scheduled dosing based on adjusted body weight (due to obesity)  Baseline risks associated with therapy include: pre-existing renal impairment, concomitant nephrotoxic medications, advanced age, and dehydration  The patient is currently prescribed 1000 mg IV q 12 hrs and after clinical evaluation will be changed to 750 mg IV q 24 hrs  Pharmacy will also follow closely for s/sx of nephrotoxicity, infusion reactions, and appropriateness of therapy  BMP and CBC will be ordered per protocol  Plan for trough as patient approaches steady state, prior to the 4th  dose at approximately 1330 on 07/03/2021  Due to infection severity, will target a trough of 15-20 (appropriate for most indications)     Pharmacy will continue to follow the patients culture results and clinical progress daily      Geeta Adorno, Pharmacist

## 2021-06-30 NOTE — PLAN OF CARE
Problem: Potential for Falls  Goal: Patient will remain free of falls  Description: INTERVENTIONS:  - Educate patient/family on patient safety including physical limitations  - Instruct patient to call for assistance with activity   - Consult OT/PT to assist with strengthening/mobility   - Keep Call bell within reach  - Keep bed low and locked with side rails adjusted as appropriate  - Keep care items and personal belongings within reach  - Initiate and maintain comfort rounds  - Make Fall Risk Sign visible to staff  - Offer Toileting every 2 Hours, in advance of need  - Initiate/Maintain bed alarm  - Obtain necessary fall risk management equipment: walker  - Apply yellow socks and bracelet for high fall risk patients  - Consider moving patient to room near nurses station  Outcome: Progressing     Problem: PAIN - ADULT  Goal: Verbalizes/displays adequate comfort level or baseline comfort level  Description: Interventions:  - Encourage patient to monitor pain and request assistance  - Assess pain using appropriate pain scale  - Administer analgesics based on type and severity of pain and evaluate response  - Implement non-pharmacological measures as appropriate and evaluate response  - Consider cultural and social influences on pain and pain management  - Notify physician/advanced practitioner if interventions unsuccessful or patient reports new pain  Outcome: Progressing     Problem: INFECTION - ADULT  Goal: Absence or prevention of progression during hospitalization  Description: INTERVENTIONS:  - Assess and monitor for signs and symptoms of infection  - Monitor lab/diagnostic results  - Monitor all insertion sites, i e  indwelling lines, tubes, and drains  - Monitor endotracheal if appropriate and nasal secretions for changes in amount and color  - [place] appropriate cooling/warming therapies per order  - Administer medications as ordered  - Instruct and encourage patient and family to use good hand hygiene technique  - Identify and instruct in appropriate isolation precautions for identified infection/condition  Outcome: Progressing  Goal: Absence of fever/infection during neutropenic period  Description: INTERVENTIONS:  - Monitor WBC    Outcome: Progressing     Problem: SAFETY ADULT  Goal: Patient will remain free of falls  Description: INTERVENTIONS:  - Educate patient/family on patient safety including physical limitations  - Instruct patient to call for assistance with activity   - Consult OT/PT to assist with strengthening/mobility   - Keep Call bell within reach  - Keep bed low and locked with side rails adjusted as appropriate  - Keep care items and personal belongings within reach  - Initiate and maintain comfort rounds  - Make Fall Risk Sign visible to staff  - Offer Toileting every 2 Hours, in advance of need  - Initiate/Maintain bed alarm  - Obtain necessary fall risk management equipment: walker  - Apply yellow socks and bracelet for high fall risk patients  - Consider moving patient to room near nurses station  Outcome: Progressing  Goal: Maintain or return to baseline ADL function  Description: INTERVENTIONS:  -  Assess patient's ability to carry out ADLs; assess patient's baseline for ADL function and identify physical deficits which impact ability to perform ADLs (bathing, care of mouth/teeth, toileting, grooming, dressing, etc )  - Assess/evaluate cause of self-care deficits   - Assess range of motion  - Assess patient's mobility; develop plan if impaired  - Assess patient's need for assistive devices and provide as appropriate  - Encourage maximum independence but intervene and supervise when necessary  - Involve family in performance of ADLs  - Assess for home care needs following discharge   - Consider OT consult to assist with ADL evaluation and planning for discharge  - Provide patient education as appropriate  Outcome: Progressing  Goal: Maintains/Returns to pre admission functional level  Description: INTERVENTIONS:  - Perform BMAT or MOVE assessment daily    - Set and communicate daily mobility goal to care team and patient/family/caregiver  - Collaborate with rehabilitation services on mobility goals if consulted  - Out of bed for toileting  - Record patient progress and toleration of activity level   Outcome: Progressing     Problem: DISCHARGE PLANNING  Goal: Discharge to home or other facility with appropriate resources  Description: INTERVENTIONS:  - Identify barriers to discharge w/patient and caregiver  - Arrange for needed discharge resources and transportation as appropriate  - Identify discharge learning needs (meds, wound care, etc )  - Arrange for interpretive services to assist at discharge as needed  - Refer to Case Management Department for coordinating discharge planning if the patient needs post-hospital services based on physician/advanced practitioner order or complex needs related to functional status, cognitive ability, or social support system  Outcome: Progressing     Problem: Knowledge Deficit  Goal: Patient/family/caregiver demonstrates understanding of disease process, treatment plan, medications, and discharge instructions  Description: Complete learning assessment and assess knowledge base    Interventions:  - Provide teaching at level of understanding  - Provide teaching via preferred learning methods  Outcome: Progressing     Problem: GASTROINTESTINAL - ADULT  Goal: Minimal or absence of nausea and/or vomiting  Description: INTERVENTIONS:  - Administer IV fluids if ordered to ensure adequate hydration  - Maintain NPO status until nausea and vomiting are resolved  - Administer ordered antiemetic medications as needed  - Provide nonpharmacologic comfort measures as appropriate  - Advance diet as tolerated, if ordered  - Consider nutrition services referral to assist patient with adequate nutrition and appropriate food choices  Outcome: Progressing     Problem: GENITOURINARY - ADULT  Goal: Maintains or returns to baseline urinary function  Description: INTERVENTIONS:  - Assess urinary function  - Encourage oral fluids to ensure adequate hydration if ordered  - Administer IV fluids as ordered to ensure adequate hydration  - Administer ordered medications as needed  - Offer frequent toileting  - Follow urinary retention protocol if ordered  Outcome: Progressing     Problem: SKIN/TISSUE INTEGRITY - ADULT  Goal: Skin Integrity remains intact(Skin Breakdown Prevention)  Description: Assess:  -Perform Danny assessment every shift  -Clean and moisturize skin every shift  -Assess extremities for adequate circulation and sensation     Bed Management:  -Have minimal linens on bed & keep smooth, unwrinkled  -Change linens as needed when moist or perspiring    Toileting:  -Offer bedside commode  -Assess for incontinence every 2 hours  -Use incontinent care products after each incontinent episode such as pads    Activity:  -Mobilize patient 3 times a day  -Encourage activity and walks on unit  -Encourage or provide ROM exercises   -Turn and reposition patient every 2 Hours  -Use appropriate equipment to lift or move patient in bed    Skin Care:  -Avoid use of baby powder, tape, friction and shearing, hot water or constrictive clothing  -Do not massage red bony areas    Outcome: Progressing     Problem: Nutrition/Hydration-ADULT  Goal: Nutrient/Hydration intake appropriate for improving, restoring or maintaining nutritional needs  Description: Monitor and assess patient's nutrition/hydration status for malnutrition  Collaborate with interdisciplinary team and initiate plan and interventions as ordered  Monitor patient's weight and dietary intake as ordered or per policy  Utilize nutrition screening tool and intervene as necessary  Determine patient's food preferences and provide high-protein, high-caloric foods as appropriate       INTERVENTIONS:  - Monitor oral intake, urinary output, labs, and treatment plans  - Assess nutrition and hydration status and recommend course of action  - Evaluate amount of meals eaten  - Assist patient with eating if necessary   - Allow adequate time for meals  - Recommend/ encourage appropriate diets, oral nutritional supplements, and vitamin/mineral supplements  - Order, calculate, and assess calorie counts as needed  - Recommend, monitor, and adjust tube feedings and TPN/PPN based on assessed needs  - Assess need for intravenous fluids  - Provide specific nutrition/hydration education as appropriate  - Include patient/family/caregiver in decisions related to nutrition  Outcome: Progressing     Problem: MOBILITY - ADULT  Goal: Maintain or return to baseline ADL function  Description: INTERVENTIONS:  -  Assess patient's ability to carry out ADLs; assess patient's baseline for ADL function and identify physical deficits which impact ability to perform ADLs (bathing, care of mouth/teeth, toileting, grooming, dressing, etc )  - Assess/evaluate cause of self-care deficits   - Assess range of motion  - Assess patient's mobility; develop plan if impaired  - Assess patient's need for assistive devices and provide as appropriate  - Encourage maximum independence but intervene and supervise when necessary  - Involve family in performance of ADLs  - Assess for home care needs following discharge   - Consider OT consult to assist with ADL evaluation and planning for discharge  - Provide patient education as appropriate  Outcome: Progressing  Goal: Maintains/Returns to pre admission functional level  Description: INTERVENTIONS:  - Perform BMAT or MOVE assessment daily    - Set and communicate daily mobility goal to care team and patient/family/caregiver     - Collaborate with rehabilitation services on mobility goals if consulted  - Out of bed for toileting  - Record patient progress and toleration of activity level   Outcome: Progressing     Problem: Prexisting or High Potential for Compromised Skin Integrity  Goal: Skin integrity is maintained or improved  Description: INTERVENTIONS:  - Identify patients at risk for skin breakdown  - Assess and monitor skin integrity  - Assess and monitor nutrition and hydration status  - Monitor labs   - Assess for incontinence   - Turn and reposition patient  - Assist with mobility/ambulation  - Relieve pressure over bony prominences  - Avoid friction and shearing  - Provide appropriate hygiene as needed including keeping skin clean and dry  - Evaluate need for skin moisturizer/barrier cream  - Collaborate with interdisciplinary team   - Patient/family teaching  - Consider wound care consult   Outcome: Progressing

## 2021-06-30 NOTE — ASSESSMENT & PLAN NOTE
· History left ductal carcinoma diagnosed 2007 with subsequent surgical resection, radiation, chemotherapy most recent dose 3 weeks ago  · Presented to receive cycle 7 of paclitaxel chemotherapy however prior to the infusion 6/4 patient and daughter complained of numerous issues including confusion, involuntary movements, hallucinations and weakness and this was canceled, postponed for approximately 2-3 weeks and received chemo 6/25  MRI brain at that time was stable  · Patient has known hepatic, lung and bony metastases  · repeat staging scans 5/26/2021 showed a decrease in size of multiple hepatic metastases and no evidence of new disease  · repeat tumor markers on 5/21 including a CA 15-3 continue to decline  · continue with denosumab every 4 weeks  · Ongoing discussions w/ Dr Chery Escalera

## 2021-06-30 NOTE — RESPIRATORY THERAPY NOTE
RT Protocol Note  Darius Keane 77 y o  female MRN: [id number]  Unit/Bed#: -01 Encounter: [id number]    Assessment    Principal Problem:    Sepsis with acute organ dysfunction [facility])  Active Problems:    Metastatic breast cancer ([address] )    Visual hallucinations    Hypothyroidism    Pituitary dysfunction ([address] )    JANNY (acute kidney injury) ([address] )    Elevation of levels of liver transaminase levels    Ambulatory dysfunction    Aspiration pneumonia (HCC)      Home Pulmonary Medications:  None       Past Medical History:   Diagnosis Date    Acquired absence of bilateral breasts and nipples     Anxiety disorder     Breast implant status     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     Cortical blindness of right side of brain     Disorder of pituitary gland (HCC)     Dysarthria and anarthria     Insomnia, unspecified     Interstitial lung disease ([facility] [address] )     Malignant neoplasm of left breast (HCC)     Polyneuropathy     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)     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[address] )     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[address] )     Secondary malignant neoplasm of other specified sites ([address] )     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     Visual hallucination      Social History     Socioeconomic History    Marital status: /Civil Union     Spouse name: None    Number of children: None    Years of education: None    Highest education level: None   Occupational History    None   Tobacco Use    Smoking status: Never Smoker    Smokeless tobacco: Never Used   Vaping Use    Vaping Use: Never used   Substance and Sexual Activity    Alcohol use: Not Currently    Drug use: Not Currently    Sexual activity: None   Other Topics Concern    None   Social History Narrative    None     Social Determinants of Health     Financial Resource Strain:     Difficulty of Paying Living Expenses:    Food Insecurity:     Worried About Running Out of Food in the Last Year:     [address] in the Last Year:    Transportation Needs:     Lack of Transportation (Medical):  Lack of Transportation (Non-Medical):    Physical Activity:     Days of Exercise per Week:     Minutes of Exercise per Session:    Stress:     Feeling of Stress :    Social Connections:     Frequency of Communication with Friends and Family:     Frequency of Social Gatherings with Friends and Family:     Attends [employer] Services:     Active Member of Clubs or Organizations:     Attends Club or Organization Meetings:     Marital Status:    Intimate Partner Violence:     Fear of Current or Ex-Partner:     Emotionally Abused:     Physically Abused:     Sexually Abused:        Subjective         Objective    Physical Exam:   Assessment Type: Assess only  General Appearance: Awake  Respiratory Pattern: Dyspnea with exertion  Chest Assessment: Chest expansion symmetrical  Bilateral Breath Sounds: Crackles  Cough: Dry  O2 Device: 6 L NC    Vitals:  Blood pressure 103/65, pulse 81, temperature 97 6 °F (36 4 °C), resp  rate 22, height 5' 3" (1 6 m), weight 68 5 kg (151 lb), SpO2 98 %  Imaging and other studies: I have personally reviewed pertinent reports  O2 Device: 6 L NC     Plan    Respiratory Plan: No distress/Pulmonary history        Resp Comments: Pt admitted due to sepsis  Pt has been here 2 days today she has become restless and halucinating  PT currently on 6 L NC No respiratory hx bs diminished with crackles  PT does not wear O2 chronically  Will monitor for 24 hrs dues to increased need for O2

## 2021-06-30 NOTE — ASSESSMENT & PLAN NOTE
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

## 2021-06-30 NOTE — ASSESSMENT & PLAN NOTE
· Creatinine 1 83 on admission with prior creatinine 0 53 on 06/06/2021  · Received aggressive fluid resuscitation however showing signs of volume overload  Kidney injury worsened with most recent creatinine 2 37   · Fluids discontinued    Give Lasix 20 mg IV x1   · Renally dose medications

## 2021-06-30 NOTE — PROGRESS NOTES
[address]  Progress Note - Anaidy Dawood 1955, 77 y o  female MRN: [id number]  Unit/Bed#: -01 Encounter: [id number]  Primary Care Provider: Nereyda Ruby MD   Date and time admitted to hospital: 6/28/2021  1:15 AM    * Sepsis with acute organ dysfunction [facility])  Assessment & Plan  · POA from skilled nursing facility with abdominal pain/right flank pain and vomiting  · Lactic acid 5 1, WBC 17, JANNY, tachycardia  · Started empiric Zosyn for possible cholangitis, MRCP does not suggest biliary tract infection  · Developed aspiration pneumonia 6/30  Antibiotics switched to cefepime and Vanco   · UA reviewed, denies symptoms  · Blood cultures negative to date  · Trend fever curve, leukocytosis, procalcitonin    Goals of care, counseling/discussion  Assessment & Plan  Ongoing goals of care conversation with patient's family  Daughter Gerald Yee is POA  Patient's primary oncologist also facilitating goals of care conversation  At this time, Gerald Nakia would like to maintain full code status for patient    Acute respiratory failure with hypoxia [facility])  Assessment & Plan  Patient requiring 6 L nasal cannula due to aspiration pneumonia and volume overload  BP soft, attempting Lasix 20 mg IV x1  Respiratory therapy  Titrate oxygen as able  Continue antibiotics  Case also discussed with critical care so they are aware that patient may have poor outcome    Aspiration pneumonia [facility])  Assessment & Plan  Patient with increasing O2 needs with crackles and rales, L>R  Chest x-ray with significant left pneumonia  Cefepime, Vanco  Aspiration precautions  Speech eval  IS  RT    JANNY (acute kidney injury) ([facility] [address] )  Assessment & Plan  · Creatinine 1 83 on admission with prior creatinine 0 53 on 06/06/2021  · Received aggressive fluid resuscitation however showing signs of volume overload  Kidney injury worsened with most recent creatinine 2 37   · Fluids discontinued    Give Lasix 20 mg IV x1   · Renally dose medications    Ambulatory dysfunction  Assessment & Plan  · Currently physical rehab at skilled nursing facility with full intention of continuing oncological treatment  · Her daughter reported episodes of confusion, involuntary movements, hallucinations, and weakness  She also had a fall in the bathroom and recent trauma to the back of her head  Some of the neurological changes started after the fall  · The etiology of her symptoms could be related to the recent move, vision loss, and toxicity from chemotherapy  Elevation of levels of liver transaminase levels  Assessment & Plan  · Patient presented with abdominal pain/flank pain and vomiting  · Found to have elevated liver enzymes, review of records there was no elevation at last hospitalization  · Patient presented with transaminitis and elevated alk-phos, today , , alk-phos 498  · Lab work from 06/06/2021 showed ALT 75, AST 64, alk-phos 93  · Recent image in is show stable, if not improved liver Mets  · Appreciate input from General surgery  · CT abdomen pelvis showed multiple hypodense hepatic nodules, right upper quadrant ultrasound showed gallbladder wall thickening without stone, small gallbladder polyp  · MRCP shows compress CBD due to metastatic burden  Discussed with surgery  No surgical intervention  · Trend LFTs, continue discussions with surgery and patient's primary oncologist Dr Honorio Kerr  · Appear Zosyn switched to cefepime Flagyl for pneumonia  She is afebrile however has leukocytosis, with normal WBC earlier this month    · Advance diet to clears, needs speech eval    Pituitary dysfunction (HCC)  Assessment & Plan  · Continue hydrocortisone b i d   · Received 10 mg IV Decadron in the emergency department during sepsis resuscitation  · Outpatient endocrinology follow-up    Hypothyroidism  Assessment & Plan  · Continue levothyroxine    Visual hallucinations  Assessment & Plan  · Patient recently residing with her daughter in [place], experienced visual hallucinations 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)  Assessment & Plan  · History left ductal carcinoma diagnosed 2007 with subsequent surgical resection, radiation, chemotherapy most recent dose 3 weeks ago  · Presented to receive cycle 7 of paclitaxel chemotherapy however prior to the infusion 6/4 patient and daughter complained of numerous issues including confusion, involuntary movements, hallucinations and weakness and this was canceled, postponed for approximately 2-3 weeks and received chemo 6/25  MRI brain at that time was stable  · Patient has known hepatic, lung and bony metastases  · repeat staging scans 5/26/2021 showed a decrease in size of multiple hepatic metastases and no evidence of new disease  · repeat tumor markers on 5/21 including a CA 15-3 continue to decline  · continue with denosumab every 4 weeks  · Ongoing discussions w/ Dr Cyrilla Blizzard  VTE Pharmacologic Prophylaxis: VTE Score: 5 Moderate Risk (Score 3-4) - Pharmacological DVT Prophylaxis Ordered: heparin  Patient Centered Rounds: I performed bedside rounds with nursing staff today  Discussions with Specialists or Other Care Team Provider:  Discussed with nursing, case management, critical care, pharmacy, outpatient Oncology specialist    Education and Discussions with Family / Patient: Updated  (daughter) via phone  I discussed with Sary  Time Spent for Care: 60 minutes  More than 50% of total time spent on counseling and coordination of care as described above  Current Length of Stay: 2 day(s)  Current Patient Status: Inpatient   Certification Statement: The patient will continue to require additional inpatient hospital stay due to Acute respiratory failure with hypoxia, sepsis, requires frequent monitoring and intense care  Discharge Plan: Uncertain at this time    Code Status: Level 1 - Full Code    Subjective:   Patient not oriented  Does not respond appropriately to provider's questions  Complaining of back pain  Objective:     Vitals:   Temp (24hrs), Av 7 °F (35 9 °C), Min:95 8 °F (35 4 °C), Max:97 6 °F (36 4 °C)    Temp:  [95 8 °F (35 4 °C)-97 6 °F (36 4 °C)] 95 8 °F (35 4 °C)  HR:  [] 91  Resp:  [18-24] 24  BP: (103-117)/(64-73) 117/64  SpO2:  [88 %-98 %] 90 %  Body mass index is 26 75 kg/m²  Input and Output Summary (last 24 hours): Intake/Output Summary (Last 24 hours) at 2021 1539  Last data filed at 2021 1405  Gross per 24 hour   Intake  75 ml   Output --   Net  75 ml       Physical Exam:   Physical Exam  Vitals and nursing note reviewed  Exam conducted with a chaperone present  Constitutional:       General: She is not in acute distress  Appearance: She is well-developed  She is ill-appearing and toxic-appearing  Comments: The patient is disoriented  Chronically blind  6 L nasal cannula currently with signs of respiratory distress  HENT:      Head: Normocephalic and atraumatic  Eyes:      Conjunctiva/sclera: Conjunctivae normal    Cardiovascular:      Rate and Rhythm: Regular rhythm  Tachycardia present  Heart sounds: No murmur heard  Pulmonary:      Effort: Respiratory distress present  Breath sounds: Normal breath sounds  Abdominal:      Palpations: Abdomen is soft  Tenderness: There is abdominal tenderness     Musculoskeletal:      Cervical back: Neck supple  Right lower leg: Edema present  Left lower leg: Edema present  Skin:     General: Skin is warm and dry  Coloration: Skin is pale  Neurological:      Mental Status: She is alert  Additional Data:     Labs:  Results from last 7 days   Lab Units 06/30/21  1129 06/29/21  0522   WBC Thousand/uL 22 38* 17 70*   HEMOGLOBIN g/dL 12 2 13 2   HEMATOCRIT % 37 7 39 8   PLATELETS Thousands/uL 251 239   NEUTROS PCT %  --  82*   LYMPHS PCT %  --  9*   LYMPHO PCT % 8*  --    MONOS PCT %  --  7   MONO PCT % 3*  --    EOS PCT % 0 0     Results from last 7 days   Lab Units 06/30/21  1129   SODIUM mmol/L 140   POTASSIUM mmol/L 4 0   CHLORIDE mmol/L 104   CO2 mmol/L 16*   BUN mg/dL 37*   CREATININE mg/dL 2 37*   ANION GAP mmol/L 20*   CALCIUM mg/dL 6 5*   ALBUMIN g/dL 2 2*   TOTAL BILIRUBIN mg/dL 4 56*   ALK PHOS U/L 445*   ALT U/L 293*   AST U/L 982*   GLUCOSE RANDOM mg/dL 225*     Results from last 7 days   Lab Units 06/28/21  0208   INR  1 21*             Results from last 7 days   Lab Units 06/29/21  0522 06/28/21  1019 06/28/21  0316 06/28/21  0121   LACTIC ACID mmol/L  --   --  2 0 5 1*   PROCALCITONIN ng/ml 2 03* 1 46*  --   --        Lines/Drains:  Invasive Devices     Peripheral Intravenous Line            Peripheral IV 06/29/21 Right Hand <1 day                      Imaging: Reviewed radiology reports from this admission including: chest xray    Recent Cultures (last 7 days):   Results from last 7 days   Lab Units 06/28/21  0247 06/28/21  0239 06/28/21  0224   BLOOD CULTURE   --  No Growth at 48 hrs  No Growth at 48 hrs     URINE CULTURE  60,000-69,000 cfu/ml Klebsiella pneumoniae*  --   --        Last 24 Hours Medication List:   Current Facility-Administered Medications   Medication Dose Route Frequency Provider Last Rate    acetaminophen  650 mg Oral Q6H PRN Blanca Flowers MD      aspirin  81 mg Oral Daily CAIT Anguiano      cefepime  1,000 mg Intravenous Q12H Bobbi Vyas PA-C 1,000 mg (06/30/21 1405)    diphenhydrAMINE  25 mg Oral Q6H PRN Allison S Ramana, CRNP      FLUoxetine  20 mg Oral Daily Allison S Ramana, CRNP      heparin (porcine)  5,000 Units Subcutaneous Q8H [facility] Allison S Ramana, CRNP      hydrocortisone  10 mg Oral Daily Allison S Ramana, CRNP      hydrocortisone  5 mg Oral Daily Allison S Ramana, CRNP      HYDROmorphone  0 5 mg Intravenous Q4H PRN Bobbi Vyas PA-C      levothyroxine  50 mcg Oral Daily Allison S Ramana, CRNP      melatonin  6 mg Oral HS Allison S Ramana, CRNP      metroNIDAZOLE  500 mg Intravenous Q8H Bobbi Vyas PA-C 500 mg (06/30/21 1405)    ondansetron  4 mg Intravenous Q8H PRN Allison S Ramana, CRNP      oxyCODONE  2 5 mg Oral Q6H PRN Iker Vyas PA-C      oxyCODONE  5 mg Oral Q6H PRN Bobbi Vyas PA-C      pantoprazole  40 mg Intravenous Q24H [facility] Allison S Ramana, CRNP      simethicone  40 mg Oral TID Bobbi Vyas PA-C      vancomycin  12 5 mg/kg (Adjusted) Intravenous Q24H Carmen Stanton  mg (06/30/21 1444)        Today, Patient Was Seen By: Bill Li PA-C    **Please Note: This note may have been constructed using a voice recognition system  **

## 2021-06-30 NOTE — ACP (ADVANCE CARE PLANNING)
Updated goals of care conversation with Dr William Hoff Do called me to discuss the outcome of his conversation with patient's daughter Dory Pollack, and daughter Jorge Abu  They would like continued cares with antibiotics and O2 support for diminishing respiratory status and possible GI infection  They would like continued lab draws and monitoring  If patient continues to decline, they may change code status  At this time, patient continues to be Level 1 Full Code, however daughter Fredi Hernandes will be visiting today and may reassess  Daughter will continue communicate with primary team here  Further goals of care discussions will be done by primary team here; Dr Hoff Do to be updated as needed only

## 2021-06-30 NOTE — OCCUPATIONAL THERAPY NOTE
Occupational Therapy Cancellation Note     Patient Name: Maty Vaughn  Today's Date: 6/30/2021  Problem List  Principal Problem:    Sepsis with acute organ dysfunction [facility])  Active Problems:    Metastatic breast cancer ([facility] [address] )    Visual hallucinations    Hypothyroidism    Pituitary dysfunction ([facility] [address] )    JANNY (acute kidney injury) ([facility] )    Elevation of levels of liver transaminase levels    Ambulatory dysfunction            06/30/21 1328   Note Type   Note type Evaluation   Cancel Reasons Medical status       OT orders received  Chart review completed  Pt admitted to [address] [place] [address] 6/28/2021 with Dx: sepsis with acute renal failure  Pt with metastatic breast cancer with Mets to liver, lung, and bone  Pt with declining medical status at this time and is not appropriate for OT services  Will continue to follow Pt and address should medical status improve       Sandee Hill OTR/L

## 2021-06-30 NOTE — ACP (ADVANCE CARE PLANNING)
Update 18:30  Patient becoming increasingly hypothermic, tachypneic, hypoxic and hypotensive  Warmer blanket applied  Multidisciplinary discussion with Critical Care and patient's POA Sary  We reviewed patient's continued decline and futility of resuscitative measures  Given patient's clinical deterioration, Sary is electing to proceed with Level 4 - DNR/DNI with comfort measures at this time  Prioritize family visitation  Continue oxygen and antibiotics at this time as well as other prn comfort measures  Plan to D/C ABX after family visitation

## 2021-06-30 NOTE — ACP (ADVANCE CARE PLANNING)
Patient with increasing O2 needs due to aspiration pneumonia and volume overload, and also worsening LFTs with heavy metastatic burden of liver and bone  Blood pressures soft  Discussed goals of care with daughter Leonard Barrientos, and patient's primary oncologist Dr Amy Okeefe, at this time, continue full care including CPR and intubation, if indicated  Maintain Level 1 Full Code status  Further goals of care conversation to happen later today as well with primary oncologist Dr Amy Stockton  He will communicate the results of this conversation to me  Continue to trend lab work  Titrate O2  Continue antibiotic therapy  Trial Lasix BP permitting  Critical care service updated re: patient's clinical status and they remain available in case of further deterioration

## 2021-06-30 NOTE — ASSESSMENT & PLAN NOTE
Ongoing goals of care conversation with patient's family  Daughter Jennifer Jorgensen is POA  Patient's primary oncologist also facilitating goals of care conversation      At this time, Sary would like to maintain full code status for patient

## 2021-06-30 NOTE — ASSESSMENT & PLAN NOTE
· Patient presented with abdominal pain/flank pain and vomiting  · Found to have elevated liver enzymes, review of records there was no elevation at last hospitalization  · Patient presented with transaminitis and elevated alk-phos, today , , alk-phos 498  · Lab work from 06/06/2021 showed ALT 75, AST 64, alk-phos 93  · Recent image in is show stable, if not improved liver Mets  · Appreciate input from General surgery  · CT abdomen pelvis showed multiple hypodense hepatic nodules, right upper quadrant ultrasound showed gallbladder wall thickening without stone, small gallbladder polyp  · MRCP shows compress CBD due to metastatic burden  Discussed with surgery  No surgical intervention  · Trend LFTs, continue discussions with surgery and patient's primary oncologist Dr Gabo Hernandez  · Appear Zosyn switched to cefepime Flagyl for pneumonia  She is afebrile however has leukocytosis, with normal WBC earlier this month    · Advance diet to clears, needs speech eval

## 2021-06-30 NOTE — ASSESSMENT & PLAN NOTE
Patient requiring 6 L nasal cannula due to aspiration pneumonia and volume overload  BP soft, attempting Lasix 20 mg IV x1  Respiratory therapy  Titrate oxygen as able  Continue antibiotics    Case also discussed with critical care so they are aware that patient may have poor outcome

## 2021-07-01 NOTE — DEATH NOTE
INPATIENT DEATH NOTE  Mulu Guerrier 77 y o  female MRN: [id number]  Unit/Bed#: -01 Encounter: [id number]       PRONOUNCEMENT OF DEATH     DOA: 21    DOD: 21  TOD: 0430    CODE STATUS AT TOD: comfort care    PATIENT ON HOSPICE?: no    FAMILY NOTIFIED?: daughter Alice Dural?: no    SUSPECTED COD: multiorgan system failure     HOSPITAL PROBLEM LIST:   Patient Active Problem List   Diagnosis    Metastatic breast cancer ([facility] )    Visual hallucinations    Hypothyroidism    Pituitary dysfunction ([facility] )    [facility] with [facility]    JANNY (acute kidney injury) ([facility] )    Elevation of levels of liver transaminase levels    Ambulatory dysfunction    Aspiration pneumonia ([facility] )    Acute respiratory failure with hypoxia (HCC)    Goals of care, counseling/discussion       PRONOUNCEMENT OF DEATH    I was contacted by nursing staff to evaluate the patient found without vital signs  Patient was identified visually and identification was confirmed by hospital ID bracelet  Patient was found laying in bed  Personally examined the patient without heart sounds auscultated on exam nor were pulses detected x 2  No breath sounds were appreciated after 2 minutes of auscultation  Pupils were fixed with absent corneal reflex bilaterally  Patient was pronounced dead at this date and time  Family is unsure of  home and will call back with that information  Please kindly review hospital chart for all details of this hospitalization and circumstances leading to death  Death certificate will be signed my attending physician at a later time

## 2021-07-01 NOTE — NURSING NOTE
RN spoke with Quintin Martines  In regards to corneal donation  All questions answered and daughter, Jennifer's phone number provided

## 2021-07-01 NOTE — DISCHARGE SUMMARY
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  She presented to [facility] on  to receive cycle 7 of paclitaxel chemotherapy but prior to the infusion she was noted to have confusion, hallucinations and weakness  Cycle was postponed until  after admitted to SNF for physical rehab  Procedures Performed:   Orders Placed This Encounter   Procedures   [address]:  Admitted from SNF to hospital on  with abdominal pain, N/V/D with severe sepsis and transaminase elevation  Treated with broad spectrum antibiotic therapy, initial chest xray was unremarkable but on  revealed aspiration pneumonia  MRCP with compressed CBD due to metastatic burden  Case was discussed with primary oncologist at [facility] Dr Jeremi Syed and general surgery at [address] [place] [address]  Ongoing advanced care planning discussions were held with patient and family   with continued clinical deterioration pt was transitioned to Level 4 DNR/DNI and initiated on comfort measures       Significant Findings, Care, Treatment and Services Provided: as above    Disposition:      Final Diagnosis:  Multiorgan failure    Medical Problems     Resolved Problems  Date Reviewed: 2021    None                  Death Note:    INPATIENT DEATH NOTE  Rajendra Hoang 77 y o  female MRN: [id number]  Unit/Bed#: -01 Encounter: [id number]       PRONOUNCEMENT OF DEATH     DOA: 21    DOD: 21  TOD: 0430    CODE STATUS AT TOD: comfort care    PATIENT ON HOSPICE?: no    FAMILY NOTIFIED?: daughter Karina Gutierrez?: no    SUSPECTED COD: multiorgan system failure     HOSPITAL PROBLEM LIST:   Patient Active Problem List   Diagnosis    Metastatic breast cancer ([facility] )    Visual hallucinations    Hypothyroidism    Pituitary dysfunction ([facility] )    [facility]    JANNY (acute kidney injury) ([facility] )    Elevation of levels of liver transaminase levels    Ambulatory dysfunction    Aspiration pneumonia ([facility] )    Acute respiratory failure with hypoxia (HCC)    Goals of care, counseling/discussion       PRONOUNCEMENT OF DEATH    I was contacted by nursing staff to evaluate the patient found without vital signs  Patient was identified visually and identification was confirmed by hospital ID bracelet  Patient was found laying in bed  Personally examined the patient without heart sounds auscultated on exam nor were pulses detected x 2  No breath sounds were appreciated after 2 minutes of auscultation  Pupils were fixed with absent corneal reflex bilaterally  Patient was pronounced dead at this date and time  Family is unsure of  home and will call back with that information  Please kindly review hospital chart for all details of this hospitalization and circumstances leading to death  Death certificate will be signed my attending physician at a later time

## 2021-07-03 LAB
BACTERIA BLD CULT: NORMAL
BACTERIA BLD CULT: NORMAL